# Patient Record
Sex: MALE | Race: WHITE | Employment: FULL TIME | URBAN - METROPOLITAN AREA
[De-identification: names, ages, dates, MRNs, and addresses within clinical notes are randomized per-mention and may not be internally consistent; named-entity substitution may affect disease eponyms.]

---

## 2017-03-20 ENCOUNTER — TELEPHONE (OUTPATIENT)
Dept: FAMILY MEDICINE CLINIC | Age: 42
End: 2017-03-20

## 2017-03-20 DIAGNOSIS — Z20.828 EXPOSURE TO THE FLU: Primary | ICD-10-CM

## 2017-03-20 RX ORDER — OSELTAMIVIR PHOSPHATE 75 MG/1
75 CAPSULE ORAL 2 TIMES DAILY
Qty: 10 CAP | Refills: 0 | Status: SHIPPED | OUTPATIENT
Start: 2017-03-20 | End: 2017-03-25

## 2017-05-06 DIAGNOSIS — F41.9 ANXIETY: ICD-10-CM

## 2017-05-07 RX ORDER — BUSPIRONE HYDROCHLORIDE 15 MG/1
TABLET ORAL
Qty: 180 TAB | Refills: 0 | Status: SHIPPED | COMMUNITY
Start: 2017-05-07 | End: 2017-07-27 | Stop reason: SDUPTHER

## 2017-05-10 ENCOUNTER — OFFICE VISIT (OUTPATIENT)
Dept: FAMILY MEDICINE CLINIC | Age: 42
End: 2017-05-10

## 2017-05-10 VITALS
BODY MASS INDEX: 28.06 KG/M2 | DIASTOLIC BLOOD PRESSURE: 62 MMHG | HEIGHT: 70 IN | OXYGEN SATURATION: 96 % | WEIGHT: 196 LBS | SYSTOLIC BLOOD PRESSURE: 122 MMHG | HEART RATE: 82 BPM | RESPIRATION RATE: 16 BRPM | TEMPERATURE: 98.6 F

## 2017-05-10 DIAGNOSIS — S56.901A INJURY OF FOREARM MUSCLE OR TENDON, RIGHT, INITIAL ENCOUNTER: ICD-10-CM

## 2017-05-10 DIAGNOSIS — E55.9 VITAMIN D DEFICIENCY: ICD-10-CM

## 2017-05-10 DIAGNOSIS — F41.9 ANXIETY: Primary | ICD-10-CM

## 2017-05-10 RX ORDER — METOPROLOL SUCCINATE 25 MG/1
TABLET, EXTENDED RELEASE ORAL
Qty: 90 TAB | Refills: 1 | Status: SHIPPED | OUTPATIENT
Start: 2017-05-10 | End: 2017-10-05 | Stop reason: DRUGHIGH

## 2017-05-10 NOTE — PROGRESS NOTES
Patient is currently not taking the following medications and wants them removed from their list:    no    Learning Assessment (baseline): complete  Depression Screening: complete      1. Have you been to the ER, urgent care clinic since your last visit? Hospitalized since your last visit? No    2. Have you seen or consulted any other health care providers outside of the [address] [place] [address] since your last visit? Include any pap smears or colon screening.  No      Patient is due for the following immunizations:    Influenza: completed

## 2017-05-10 NOTE — PATIENT INSTRUCTIONS
Anxiety Disorder: Care Instructions  Your Care Instructions  Anxiety is a normal reaction to stress. Difficult situations can cause you to have symptoms such as sweaty palms and a nervous feeling. In an anxiety disorder, the symptoms are far more severe. Constant worry, muscle tension, trouble sleeping, nausea and diarrhea, and other symptoms can make normal daily activities difficult or impossible. These symptoms may occur for no reason, and they can affect your work, school, or social life. Medicines, counseling, and self-care can all help. Follow-up care is a key part of your treatment and safety. Be sure to make and go to all appointments, and call your doctor if you are having problems. It's also a good idea to know your test results and keep a list of the medicines you take. How can you care for yourself at home? · Take medicines exactly as directed. Call your doctor if you think you are having a problem with your medicine. · Go to your counseling sessions and follow-up appointments. · Recognize and accept your anxiety. Then, when you are in a situation that makes you anxious, say to yourself, \"This is not an emergency. I feel uncomfortable, but I am not in danger. I can keep going even if I feel anxious. \"  · Be kind to your body:  ¨ Relieve tension with exercise or a massage. ¨ Get enough rest.  ¨ Avoid alcohol, caffeine, nicotine, and illegal drugs. They can increase your anxiety level and cause sleep problems. ¨ Learn and do relaxation techniques. See below for more about these techniques. · Engage your mind. Get out and do something you enjoy. Go to a funny movie, or take a walk or hike. Plan your day. Having too much or too little to do can make you anxious. · Keep a record of your symptoms. Discuss your fears with a good friend or family member, or join a support group for people with similar problems. Talking to others sometimes relieves stress.   · Get involved in social groups, or volunteer to help others. Being alone sometimes makes things seem worse than they are. · Get at least 30 minutes of exercise on most days of the week to relieve stress. Walking is a good choice. You also may want to do other activities, such as running, swimming, cycling, or playing tennis or team sports. Relaxation techniques  Do relaxation exercises 10 to 20 minutes a day. You can play soothing, relaxing music while you do them, if you wish. · Tell others in your house that you are going to do your relaxation exercises. Ask them not to disturb you. · Find a comfortable place, away from all distractions and noise. · Lie down on your back, or sit with your back straight. · Focus on your breathing. Make it slow and steady. · Breathe in through your nose. Breathe out through either your nose or mouth. · Breathe deeply, filling up the area between your navel and your rib cage. Breathe so that your belly goes up and down. · Do not hold your breath. · Breathe like this for 5 to 10 minutes. Notice the feeling of calmness throughout your whole body. As you continue to breathe slowly and deeply, relax by doing the following for another 5 to 10 minutes:  · Tighten and relax each muscle group in your body. You can begin at your toes and work your way up to your head. · Imagine your muscle groups relaxing and becoming heavy. · Empty your mind of all thoughts. · Let yourself relax more and more deeply. · Become aware of the state of calmness that surrounds you. · When your relaxation time is over, you can bring yourself back to alertness by moving your fingers and toes and then your hands and feet and then stretching and moving your entire body. Sometimes people fall asleep during relaxation, but they usually wake up shortly afterward. · Always give yourself time to return to full alertness before you drive a car or do anything that might cause an accident if you are not fully alert.  Never play a relaxation tape while you drive a car. When should you call for help? Call 911 anytime you think you may need emergency care. For example, call if:  · You feel you cannot stop from hurting yourself or someone else. Keep the numbers for these national suicide hotlines: 5-826-984-TALK (0-154.198.3768) and 1-269-QHPQMSC (2-729.493.4439). If you or someone you know talks about suicide or feeling hopeless, get help right away. Watch closely for changes in your health, and be sure to contact your doctor if:  · You have anxiety or fear that affects your life. · You have symptoms of anxiety that are new or different from those you had before. Where can you learn more? Go to http://jorge lHiChinaxin.info/. Enter P754 in the search box to learn more about \"Anxiety Disorder: Care Instructions. \"  Current as of: July 26, 2016  Content Version: 11.2  © 5629-7261 [employer]. Care instructions adapted under license by [employer] (which disclaims liability or warranty for this information). If you have questions about a medical condition or this instruction, always ask your healthcare professional. [address] any warranty or liability for your use of this information. Learning About Vitamin D  Why is it important to get enough vitamin D? Your body needs vitamin D to absorb calcium. Calcium keeps your bones and muscles, including your heart, healthy and strong. If your muscles don't get enough calcium, they can cramp, hurt, or feel weak. You may have long-term (chronic) muscle aches and pains. If you don't get enough vitamin D throughout life, you have an increased chance of having thin and brittle bones (osteoporosis) in your later years. Children who don't get enough vitamin D may not grow as much as others their age. They also have a chance of getting a rare disease called rickets. It causes weak bones. Vitamin D and calcium are added to many foods.  And your body uses sunshine to make its own vitamin D. How much vitamin D do you need? The [place] of Medicine recommends that people ages 3 through 79 get 600 IU (international units) every day. Adults 71 and older need 800 IU every day. Blood tests for vitamin D can check your vitamin D level. But there is no standard normal range used by all laboratories. The [place] of Medicine recommends a blood level of 20 ng/mL of vitamin D for healthy bones. And most people in the [place] and [facility]) meet this goal.  How can you get more vitamin D? Foods that contain vitamin D include:  · [place], tuna, and mackerel. These are some of the best foods to eat when you need to get more vitamin D.  · Cheese, egg yolks, and beef liver. These foods have vitamin D in small amounts. · Milk, soy drinks, orange juice, yogurt, margarine, and some kinds of cereal have vitamin D added to them. Some people don't make vitamin D as well as others. They may have to take extra care in getting enough vitamin D. Things that reduce how much vitamin D your body makes include:  · Dark skin, such as many  Americans have. · Age, especially if you are older than 72. · Digestive problems, such as Crohn's or celiac disease. · Liver and kidney disease. Some people who do not get enough vitamin D may need supplements. Are there any risks from taking vitamin D?  · Too much vitamin D:  ¨ Can damage your kidneys. ¨ Can cause nausea and vomiting, constipation, and weakness. ¨ Raises the amount of calcium in your blood. If this happens, you can get confused or have an irregular heart rhythm. · Vitamin D may interact with other medicines. Tell your doctor about all of the medicines you take, including over-the-counter drugs, herbs, and pills. Tell your doctor about all of your current medical problems. Where can you learn more? Go to http://jorge l-xin.info/.   Enter [id number] in the search box to learn more about \"Learning About Vitamin D.\"  Current as of: July 26, 2016  Content Version: 11.2  © 3972-6134 [employer]. Care instructions adapted under license by [employer] (which disclaims liability or warranty for this information). If you have questions about a medical condition or this instruction, always ask your healthcare professional. [place] [address] any warranty or liability for your use of this information.

## 2017-05-10 NOTE — PROGRESS NOTES
HISTORY OF PRESENT ILLNESS    Leotha Holstein is a 42y.o. year old male with: Anxiety/Depression/ Vit D deficiency        HPI:  Has been doing great with current Tx and did miss one buspar a little while ago and definitely noticed a difference. Pain in elbow right side for a couple months. Used hand as a hammer and pain with rotating arm. Rated 3/10. PHQ-9 SCORE: 0     SHRUTHI-7 SCORE: 3        Current Outpatient Prescriptions   Medication Sig Dispense Refill    busPIRone (BUSPAR) 15 mg tablet TAKE ONE TABLET BY MOUTH TWICE DAILY 180 Tab 0    metoprolol succinate (TOPROL-XL) 25 mg XL tablet TAKE ONE TABLET BY MOUTH NIGHTLY 90 Tab 1    valACYclovir (VALTREX) 1 gram tablet Take 1 Tab by mouth three (3) times daily. 30 Tab 5    HYDROcodone-acetaminophen (NORCO) 5-325 mg per tablet Take 1 Tab by mouth every four (4) hours as needed for Pain for up to 20 doses. Max Daily Amount: 6 Tabs. 20 Tab 0    aspirin delayed-release 81 mg tablet Take  by mouth daily.  cholecalciferol (VITAMIN D3) 1,000 unit tablet Take 1 Tab by mouth daily. 30 Tab 3     Patient Active Problem List   Diagnosis Code    VSD (ventricular septal defect) Q21.0    Atrial flutter (HCC) I48.92    Mitral valve disorders I05.9    Cardiomyopathy ([address] I42.9    S/P VSD repair Z98.890, Z87.74    S/P mitral valve repair Z98.890    S/P ablation of atrial flutter Z98.890, Z86.79    Long term current use of anticoagulant therapy Z79.01    Anxiety F41.9    Vitamin D deficiency E55.9    Encounter for sterilization Z30.2    Epididymitis N45.1     Family History   Problem Relation Age of Onset    Diabetes Maternal Grandmother        ROS:  No swell, bruise, SI, HI. Objective:  Visit Vitals    /62 (BP 1 Location: Right arm, BP Patient Position: Sitting)    Pulse 82    Temp 98.6 °F (37 °C) (Oral)    Resp 16    Ht 5' 10\" (1.778 m)    Wt 196 lb (88.9 kg)    SpO2 96%    BMI 28.12 kg/m2       GEN:  Appears stated age in NAD.   HEAD: Normocephalic, Atraumatic. NEURO:  Sensation intact light touch upper and lower extremities. Biceps & Triceps reflexes +2/4 bilaterally. right hand dominant. M/S:  right elbow/wrist: Positive TTP supinator, worse with resisted supination. Negative shayy tenderness. Phalen's negative. Tinel's negative. Strength +5/5 bilateral .  Piano key sign Negative bilateral .  Carpal bone motion normal.  Finklestein's negative  TFCC Load Test negative. Lateral epicondyle negative TTP has improved with wrist extension. Medial epicondyle negative TTP is unchanged with wrist flexion. negative muscular atrophy. EXT:  no clubbing/cyanosis. no edema. Assessment/Plan:     ICD-10-CM ICD-9-CM    1. Anxiety F41.9 300.00 metoprolol succinate (TOPROL-XL) 25 mg XL tablet   2. Vitamin D deficiency E55.9 268.9 metoprolol succinate (TOPROL-XL) 25 mg XL tablet   3. Injury of forearm muscle or tendon, right, initial encounter S56.901A 959.3        Patient verbalized understanding of plan. Will Rx buspar and convert to express scripts when ready and stretch supinator for elbow pain.

## 2017-05-10 NOTE — MR AVS SNAPSHOT
Visit Information Date & Time Provider Department Dept. Phone Encounter #  
 5/10/2017  4:20 PM Edward Hoffmann [phone] Follow-up Instructions Return if symptoms worsen or fail to improve. Your Appointments 7/6/2017  4:00 PM  
Follow Up with Jac Bright MD  
[facility] ([facility]) Appt Note: 1 year follow up with EKG  
 [address] [place] [phone] [id number] [address] [address] [phone] [address] Upcoming Health Maintenance Date Due DTaP/Tdap/Td series (1 - Tdap) 10/30/1995 Pneumococcal 19-64 Medium Risk (1 of 1 - PPSV23) 11/10/2017* INFLUENZA AGE 9 TO ADULT 8/1/2017 *Topic was postponed. The date shown is not the original due date. Allergies as of 5/10/2017  Review Complete On: 5/10/2017 By: Saida Patrick DO No Known Allergies Current Immunizations  Never Reviewed Name Date Influenza Vaccine 10/14/2016 Influenza Vaccine (Quad) PF 9/2/2015 Not reviewed this visit You Were Diagnosed With   
  
 Codes Comments Anxiety    -  Primary ICD-10-CM: F41.9 ICD-9-CM: 300.00 Vitamin D deficiency     ICD-10-CM: E55.9 ICD-9-CM: 268.9 Injury of forearm muscle or tendon, right, initial encounter     ICD-10-CM: J84.536X ICD-9-CM: 763. 3 Vitals BP Pulse Temp Resp Height(growth percentile) Weight(growth percentile) 122/62 (BP 1 Location: Right arm, BP Patient Position: Sitting) 82 98.6 °F (37 °C) (Oral) 16 5' 10\" (1.778 m) 196 lb (88.9 kg) SpO2 BMI Smoking Status 96% 28.12 kg/m2 Never Smoker BMI and BSA Data Body Mass Index Body Surface Area  
 28.12 kg/m 2 2.1 m 2 Preferred Pharmacy Pharmacy Name Phone [facility] [address] Your Updated Medication List  
  
 This list is accurate as of: 5/10/17  4:40 PM.  Always use your most recent med list.  
  
  
  
  
 aspirin delayed-release 81 mg tablet Take  by mouth daily. busPIRone 15 mg tablet Commonly known as:  BUSPAR  
TAKE ONE TABLET BY MOUTH TWICE DAILY  
  
 cholecalciferol 1,000 unit tablet Commonly known as:  VITAMIN D3 Take 1 Tab by mouth daily. metoprolol succinate 25 mg XL tablet Commonly known as:  TOPROL-XL  
TAKE ONE TABLET BY MOUTH NIGHTLY  
  
 valACYclovir 1 gram tablet Commonly known as:  VALTREX Take 1 Tab by mouth three (3) times daily. Prescriptions Sent to Pharmacy Refills  
 metoprolol succinate (TOPROL-XL) 25 mg XL tablet 1 Sig: TAKE ONE TABLET BY MOUTH NIGHTLY Class: Normal  
 Pharmacy: [id number] [address] [place] [address] [facility] #: [phone] Follow-up Instructions Return if symptoms worsen or fail to improve. Patient Instructions Anxiety Disorder: Care Instructions Your Care Instructions Anxiety is a normal reaction to stress. Difficult situations can cause you to have symptoms such as sweaty palms and a nervous feeling. In an anxiety disorder, the symptoms are far more severe. Constant worry, muscle tension, trouble sleeping, nausea and diarrhea, and other symptoms can make normal daily activities difficult or impossible. These symptoms may occur for no reason, and they can affect your work, school, or social life. Medicines, counseling, and self-care can all help. Follow-up care is a key part of your treatment and safety. Be sure to make and go to all appointments, and call your doctor if you are having problems. It's also a good idea to know your test results and keep a list of the medicines you take. How can you care for yourself at home? · Take medicines exactly as directed. Call your doctor if you think you are having a problem with your medicine. · Go to your counseling sessions and follow-up appointments. · Recognize and accept your anxiety. Then, when you are in a situation that makes you anxious, say to yourself, \"This is not an emergency. I feel uncomfortable, but I am not in danger. I can keep going even if I feel anxious. \" · Be kind to your body: ¨ Relieve tension with exercise or a massage. ¨ Get enough rest. 
¨ Avoid alcohol, caffeine, nicotine, and illegal drugs. They can increase your anxiety level and cause sleep problems. ¨ Learn and do relaxation techniques. See below for more about these techniques. · Engage your mind. Get out and do something you enjoy. Go to a funny movie, or take a walk or hike. Plan your day. Having too much or too little to do can make you anxious. · Keep a record of your symptoms. Discuss your fears with a good friend or family member, or join a support group for people with similar problems. Talking to others sometimes relieves stress. · Get involved in social groups, or volunteer to help others. Being alone sometimes makes things seem worse than they are. · Get at least 30 minutes of exercise on most days of the week to relieve stress. Walking is a good choice. You also may want to do other activities, such as running, swimming, cycling, or playing tennis or team sports. Relaxation techniques Do relaxation exercises 10 to 20 minutes a day. You can play soothing, relaxing music while you do them, if you wish. · Tell others in your house that you are going to do your relaxation exercises. Ask them not to disturb you. · Find a comfortable place, away from all distractions and noise. · Lie down on your back, or sit with your back straight. · Focus on your breathing. Make it slow and steady. · Breathe in through your nose. Breathe out through either your nose or mouth. · Breathe deeply, filling up the area between your navel and your rib cage. Breathe so that your belly goes up and down. · Do not hold your breath. · Breathe like this for 5 to 10 minutes. Notice the feeling of calmness throughout your whole body. As you continue to breathe slowly and deeply, relax by doing the following for another 5 to 10 minutes: · Tighten and relax each muscle group in your body. You can begin at your toes and work your way up to your head. · Imagine your muscle groups relaxing and becoming heavy. · Empty your mind of all thoughts. · Let yourself relax more and more deeply. · Become aware of the state of calmness that surrounds you. · When your relaxation time is over, you can bring yourself back to alertness by moving your fingers and toes and then your hands and feet and then stretching and moving your entire body. Sometimes people fall asleep during relaxation, but they usually wake up shortly afterward. · Always give yourself time to return to full alertness before you drive a car or do anything that might cause an accident if you are not fully alert. Never play a relaxation tape while you drive a car. When should you call for help? Call 911 anytime you think you may need emergency care. For example, call if: 
· You feel you cannot stop from hurting yourself or someone else. Keep the numbers for these national suicide hotlines: 7-755-010-TALK (3-526.246.5315) and 9-523-PCVJSPW (3-195.620.4101). If you or someone you know talks about suicide or feeling hopeless, get help right away. Watch closely for changes in your health, and be sure to contact your doctor if: 
· You have anxiety or fear that affects your life. · You have symptoms of anxiety that are new or different from those you had before. Where can you learn more? Go to http://jorge l-xin.info/. Enter P754 in the search box to learn more about \"Anxiety Disorder: Care Instructions. \" Current as of: July 26, 2016 Content Version: 11.2 © 6590-5418 [employer].  Care instructions adapted under license by [facility] [address] (which disclaims liability or warranty for this information). If you have questions about a medical condition or this instruction, always ask your healthcare professional. [address] any warranty or liability for your use of this information. Learning About Vitamin D Why is it important to get enough vitamin D? Your body needs vitamin D to absorb calcium. Calcium keeps your bones and muscles, including your heart, healthy and strong. If your muscles don't get enough calcium, they can cramp, hurt, or feel weak. You may have long-term (chronic) muscle aches and pains. If you don't get enough vitamin D throughout life, you have an increased chance of having thin and brittle bones (osteoporosis) in your later years. Children who don't get enough vitamin D may not grow as much as others their age. They also have a chance of getting a rare disease called rickets. It causes weak bones. Vitamin D and calcium are added to many foods. And your body uses sunshine to make its own vitamin D. How much vitamin D do you need? The [place] of Medicine recommends that people ages 3 through 79 get 600 IU (international units) every day. Adults 71 and older need 800 IU every day. Blood tests for vitamin D can check your vitamin D level. But there is no standard normal range used by all laboratories. The [place] of Medicine recommends a blood level of 20 ng/mL of vitamin D for healthy bones. And most people in the [place] and [facility] meet this goal. 
How can you get more vitamin D? Foods that contain vitamin D include: 
· [place], tuna, and mackerel. These are some of the best foods to eat when you need to get more vitamin D. 
· Cheese, egg yolks, and beef liver. These foods have vitamin D in small amounts. · Milk, soy drinks, orange juice, yogurt, margarine, and some kinds of cereal have vitamin D added to them. Some people don't make vitamin D as well as others. They may have to take extra care in getting enough vitamin D. Things that reduce how much vitamin D your body makes include: · Dark skin, such as many  Americans have. · Age, especially if you are older than 72. · Digestive problems, such as Crohn's or celiac disease. · Liver and kidney disease. Some people who do not get enough vitamin D may need supplements. Are there any risks from taking vitamin D? 
· Too much vitamin D: 
¨ Can damage your kidneys. ¨ Can cause nausea and vomiting, constipation, and weakness. ¨ Raises the amount of calcium in your blood. If this happens, you can get confused or have an irregular heart rhythm. · Vitamin D may interact with other medicines. Tell your doctor about all of the medicines you take, including over-the-counter drugs, herbs, and pills. Tell your doctor about all of your current medical problems. Where can you learn more? Go to http://jorge l-xin.info/. Enter [id number] in the search box to learn more about \"Learning About Vitamin D.\" 
Current as of: July 26, 2016 Content Version: 11.2 © 2508-3683 [employer]. Care instructions adapted under license by Currently (which disclaims liability or warranty for this information). If you have questions about a medical condition or this instruction, always ask your healthcare professional. [address] any warranty or liability for your use of this information. Introducing [facility]! Dear Boone Ricks: Thank you for requesting a SparkupReader account. Our records indicate that you have previously registered for a SparkupReader account but its currently inactive. Please call our SparkupReader support line at [phone]. Additional Information If you have questions, please visit the Frequently Asked Questions section of the SparkupReader website at [email or web address] Remember, Fanplayrhart is NOT to be used for urgent needs. For medical emergencies, dial 911. Now available from your iPhone and Android! Please provide this summary of care documentation to your next provider. Your primary care clinician is listed as Ryder Sheridan. If you have any questions after today's visit, please call [phone].

## 2017-07-06 ENCOUNTER — OFFICE VISIT (OUTPATIENT)
Dept: CARDIOLOGY CLINIC | Age: 42
End: 2017-07-06

## 2017-07-06 VITALS
HEIGHT: 70 IN | OXYGEN SATURATION: 99 % | RESPIRATION RATE: 18 BRPM | HEART RATE: 89 BPM | SYSTOLIC BLOOD PRESSURE: 104 MMHG | BODY MASS INDEX: 27.6 KG/M2 | DIASTOLIC BLOOD PRESSURE: 80 MMHG | WEIGHT: 192.8 LBS

## 2017-07-06 DIAGNOSIS — I48.92 ATRIAL FLUTTER, UNSPECIFIED TYPE (HCC): Primary | ICD-10-CM

## 2017-07-06 DIAGNOSIS — Z98.890 S/P MITRAL VALVE REPAIR: ICD-10-CM

## 2017-07-06 DIAGNOSIS — Z87.74 S/P VSD REPAIR: ICD-10-CM

## 2017-07-06 NOTE — MR AVS SNAPSHOT
Visit Information Date & Time Provider Department Dept. Phone Encounter #  
 7/6/2017  4:00 PM Emilee Ewing MD [facility] [phone] Upcoming Health Maintenance Date Due DTaP/Tdap/Td series (1 - Tdap) 10/30/1995 Pneumococcal 19-64 Medium Risk (1 of 1 - PPSV23) 11/10/2017* INFLUENZA AGE 9 TO ADULT 8/1/2017 *Topic was postponed. The date shown is not the original due date. Allergies as of 7/6/2017  Review Complete On: 7/6/2017 By: Lenora Redman LPN No Known Allergies Current Immunizations  Never Reviewed Name Date Influenza Vaccine 10/14/2016 Influenza Vaccine (Quad) PF 9/2/2015 Not reviewed this visit You Were Diagnosed With   
  
 Codes Comments Atrial flutter, unspecified type ([facility]    -  Primary ICD-10-CM: I48.92 
ICD-9-CM: 427.32 Vitals BP Pulse Resp Height(growth percentile) Weight(growth percentile) SpO2  
 104/80 89 18 5' 10\" (1.778 m) 192 lb 12.8 oz (87.5 kg) 99% BMI Smoking Status 27.66 kg/m2 Never Smoker BMI and BSA Data Body Mass Index Body Surface Area  
 27.66 kg/m 2 2.08 m 2 Preferred Pharmacy Pharmacy Name Phone [facility] [address] [facility]  Your Updated Medication List  
  
   
This list is accurate as of: 7/6/17  4:43 PM.  Always use your most recent med list.  
  
  
  
  
 aspirin delayed-release 81 mg tablet Take  by mouth daily. busPIRone 15 mg tablet Commonly known as:  BUSPAR  
TAKE ONE TABLET BY MOUTH TWICE DAILY  
  
 cholecalciferol 1,000 unit tablet Commonly known as:  VITAMIN D3 Take 1 Tab by mouth daily. metoprolol succinate 25 mg XL tablet Commonly known as:  TOPROL-XL  
TAKE ONE TABLET BY MOUTH NIGHTLY  
  
 valACYclovir 1 gram tablet Commonly known as:  VALTREX Take 1 Tab by mouth three (3) times daily. We Performed the Following AMB POC EKG ROUTINE W/ 12 LEADS, INTER & REP [92477 CPT(R)] Introducing [facility] SERVICES! Dear Reza Smith: Thank you for requesting a Quolaw account. Our records indicate that you have previously registered for a Quolaw account but its currently inactive. Please call our Quolaw support line at [phone]. Additional Information If you have questions, please visit the Frequently Asked Questions section of the Quolaw website at [email or web address] Remember, Quolaw is NOT to be used for urgent needs. For medical emergencies, dial 911. Now available from your iPhone and Android! Please provide this summary of care documentation to your next provider. Your primary care clinician is listed as Dante Font. If you have any questions after today's visit, please call [phone].

## 2017-07-06 NOTE — PROGRESS NOTES
History of Present Illness:  A 42 y.o. male here for follow up. He is working in a marine occupation with [address] He has a history of mitral stenosis after mitral valve ring. He had atrial flutter that was ablated June 2013 by Dr. Suzie Minaya due to atypical flutter. He denies chest pain,dyspnea, presyncope, syncope, PND, orthopnea, edema. He is off Coumadin and back on aspirin and has not had any clinical recurrence in almost four years.       Impression/Plan:    Paroxysmal atrial flutter with difficult to control rates and right sided figure eight ablation 6/2013 by Dr. Suzie Minaya. No clinical recurrence. History of mild to moderate mitral stenosis in sinus rhythm, but severe when in atrial flutter. History of remote mitral valve ring. History of VSD repair. History of congestive heart failure with atrial flutter, but now compensated. Nuclear stress test in 03/2014 without ischemia.     From a cardiac standpoint, he is doing well. He is maintaining sinus rhythm. He has not had any clinical recurrence of atrial flutter or atrial fibrillation. He wanted to be off Coumadin last year which is reasonable given no clinical recurrence but once again I discussed the potential of asymptomatic arrhythmia and the risk of stroke. He is taking aspirin only. I will recheck an echocardiogram to make sure there is no significant change in his heart function. Last ejection fraction was mildly depressed during his nuclear scan with EF of 49% March 2014. All questions answered. Past Medical History:   Diagnosis Date    Anxiety     Atrial flutter with rapid ventricular response [facility]) August 2013    Status post cardioversion    Cardiac Holter exam 07/20/2015    Normal 48-hr Holter. Sinus rhythm, avg HR 78 bpm.  No ventricular ectopy. Very rare supraventricular ectopy.  Cardiac nuclear imaging test 03/18/2014    No ischemia or prior infarction. No RWMA. EF 49%.   Nondiagnostic EKG on max EST due to abnormal baseline. Ex time 13 min 5 sec.  Cardiac transesophageal echocardiography (ROCAEL) 11/25/2013    EF 35%. Mod diffuse hypk. Marked LAE. Dilated LA appendage w/no thrombus. No L-R or R-L atrial septal shunt by contrast.  Marked TYSON. MV annular ring prosthesis. Mod MS. Mild MR.  Cardiomyopathy ([address]     EF 40-45%    Herniated disc     Mitral valve stenosis, moderate     S/P mitral valve repair     severe MR    S/P VSD repair     Vitamin D deficiency 12/10/2014       Current Outpatient Prescriptions   Medication Sig Dispense Refill    metoprolol succinate (TOPROL-XL) 25 mg XL tablet TAKE ONE TABLET BY MOUTH NIGHTLY 90 Tab 1    busPIRone (BUSPAR) 15 mg tablet TAKE ONE TABLET BY MOUTH TWICE DAILY 180 Tab 0    valACYclovir (VALTREX) 1 gram tablet Take 1 Tab by mouth three (3) times daily. 30 Tab 5    aspirin delayed-release 81 mg tablet Take  by mouth daily.  cholecalciferol (VITAMIN D3) 1,000 unit tablet Take 1 Tab by mouth daily. (Patient taking differently: Take 1,000 Units by mouth daily. 2 tabs daily) 30 Tab 3       Social History   reports that he has never smoked. He has never used smokeless tobacco.   reports that he drinks alcohol. Family History  family history includes Diabetes in his maternal grandmother. Review of Systems  Except as stated above include:  Constitutional: Negative for fever, chills and malaise/fatigue. HEENT: No congestion or recent URI. Gastrointestinal: No nausea, vomiting, abdominal pain, bloody stools. Pulmonary:  Negative except as stated above. Cardiac:  Negative except as stated above. Musculoskeletal: Negative except as stated above. Neurological:  No localized symptoms. Skin:  Negative except as stated above. Psych:  No mood swings. Endocrine:  No heat/cold intolerance.     PHYSICAL EXAM  BP Readings from Last 3 Encounters:   07/06/17 104/80   05/10/17 122/62   11/28/16 118/70     Pulse Readings from Last 3 Encounters:   07/06/17 89   05/10/17 82   11/28/16 78     Wt Readings from Last 3 Encounters:   07/06/17 87.5 kg (192 lb 12.8 oz)   05/10/17 88.9 kg (196 lb)   11/28/16 90.7 kg (200 lb)     General:   Well developed, well groomed. Head/Neck:   No jugular venous distention     No carotid bruits. No evidence of xanthelasma. Lungs:   No respiratory distress. Clear bilaterally. Heart:    Regular rate and rhythm. Normal S1/S2. Palpation of heart with normal point of maximum impulse. No significant murmurs, rubs or gallops. Abdomen:   Soft and nontender. No palpable abdominal mass or bruits. Extremities:   Intact peripheral pulses. No significant edema. Neurological:   Alert and oriented to person, place, time. No focal neurological deficit visually.

## 2017-07-20 ENCOUNTER — HOSPITAL ENCOUNTER (OUTPATIENT)
Dept: NON INVASIVE DIAGNOSTICS | Age: 42
Discharge: HOME | End: 2017-07-20
Attending: INTERNAL MEDICINE
Payer: COMMERCIAL

## 2017-07-20 DIAGNOSIS — I48.92 ATRIAL FLUTTER, UNSPECIFIED TYPE (HCC): ICD-10-CM

## 2017-07-20 DIAGNOSIS — Z87.74 S/P VSD REPAIR: ICD-10-CM

## 2017-07-20 DIAGNOSIS — Z98.890 S/P MITRAL VALVE REPAIR: ICD-10-CM

## 2017-07-20 PROCEDURE — 93306 TTE W/DOPPLER COMPLETE: CPT

## 2017-07-26 DIAGNOSIS — F41.9 ANXIETY: ICD-10-CM

## 2017-07-27 RX ORDER — BUSPIRONE HYDROCHLORIDE 15 MG/1
TABLET ORAL
Qty: 180 TAB | Refills: 0 | Status: SHIPPED | COMMUNITY
Start: 2017-07-27 | End: 2017-10-30 | Stop reason: SDUPTHER

## 2017-10-03 ENCOUNTER — CLINICAL SUPPORT (OUTPATIENT)
Dept: CARDIOLOGY CLINIC | Age: 42
End: 2017-10-03

## 2017-10-03 VITALS
HEART RATE: 121 BPM | SYSTOLIC BLOOD PRESSURE: 116 MMHG | DIASTOLIC BLOOD PRESSURE: 80 MMHG | OXYGEN SATURATION: 97 % | HEIGHT: 70 IN

## 2017-10-03 DIAGNOSIS — I48.92 ATRIAL FLUTTER, UNSPECIFIED TYPE (HCC): Primary | ICD-10-CM

## 2017-10-03 NOTE — PROGRESS NOTES
Visit Vitals    /80 (BP 1 Location: Left arm, BP Patient Position: Sitting)    Pulse (!) 121    Ht 5' 10\" (1.778 m)    SpO2 97%     Current Outpatient Prescriptions   Medication Sig    busPIRone (BUSPAR) 15 mg tablet TAKE ONE TABLET BY MOUTH TWICE DAILY    metoprolol succinate (TOPROL-XL) 25 mg XL tablet TAKE ONE TABLET BY MOUTH NIGHTLY    valACYclovir (VALTREX) 1 gram tablet Take 1 Tab by mouth three (3) times daily.  aspirin delayed-release 81 mg tablet Take  by mouth daily.  cholecalciferol (VITAMIN D3) 1,000 unit tablet Take 1 Tab by mouth daily. (Patient taking differently: Take 1,000 Units by mouth daily. 2 tabs daily)     No current facility-administered medications for this visit. Patient walked in complaining of heart racing. EKG done and discussed with Dr. Fernando Roth. Increase Toprol XL to 50 mg twice daily. Follow up with Dr. Jem Covington. Verbal order and read back per Jesus Billings M.D.    EKG:   Ectopic atrial tachycardia, rate 121 with complete RBBB    Pt aware.  Appt scheduled with Orlena Lennox, NP this Thursday and Dr. Jem Covington on 10/19/17

## 2017-10-05 ENCOUNTER — OFFICE VISIT (OUTPATIENT)
Dept: CARDIOLOGY CLINIC | Age: 42
End: 2017-10-05

## 2017-10-05 VITALS
HEIGHT: 70 IN | DIASTOLIC BLOOD PRESSURE: 74 MMHG | OXYGEN SATURATION: 98 % | SYSTOLIC BLOOD PRESSURE: 98 MMHG | WEIGHT: 192 LBS | HEART RATE: 123 BPM | BODY MASS INDEX: 27.49 KG/M2

## 2017-10-05 DIAGNOSIS — I48.92 ATRIAL FLUTTER, UNSPECIFIED TYPE (HCC): Primary | ICD-10-CM

## 2017-10-05 DIAGNOSIS — Z98.890 S/P ABLATION OF ATRIAL FLUTTER: ICD-10-CM

## 2017-10-05 DIAGNOSIS — Z87.74 S/P VSD REPAIR: ICD-10-CM

## 2017-10-05 DIAGNOSIS — Z86.79 S/P ABLATION OF ATRIAL FLUTTER: ICD-10-CM

## 2017-10-05 DIAGNOSIS — Z79.01 LONG TERM CURRENT USE OF ANTICOAGULANT THERAPY: ICD-10-CM

## 2017-10-05 LAB
INR BLD: 0.9
PT POC: 11.3 SECONDS
VALID INTERNAL CONTROL?: YES

## 2017-10-05 RX ORDER — WARFARIN SODIUM 5 MG/1
5 TABLET ORAL DAILY
Qty: 45 TAB | Refills: 4 | Status: SHIPPED | OUTPATIENT
Start: 2017-10-05 | End: 2018-07-20 | Stop reason: SDUPTHER

## 2017-10-05 RX ORDER — METOPROLOL SUCCINATE 50 MG/1
50 TABLET, EXTENDED RELEASE ORAL 2 TIMES DAILY
Qty: 60 TAB | Refills: 4 | Status: SHIPPED | OUTPATIENT
Start: 2017-10-05 | End: 2018-03-14 | Stop reason: SDUPTHER

## 2017-10-05 NOTE — MR AVS SNAPSHOT
Visit Information Date & Time Provider Department Dept. Phone Encounter #  
 10/5/2017  2:00 PM Bandar Beltran NP [facility] [id number] Your Appointments 10/10/2017  1:10 PM  
ANTICOAG NURSE with Pt Inr Hv Csi [facility] ([address] [place] [address]) [address] [id number] [phone] [address] [facility] [address] [address]  
  
    
 10/19/2017 10:20 AM  
Follow Up with Ruth Ruiz MD  
[facility] ([address] [place] [address]) Appt Note: aflutter [address] [id number] [phone] [address] [facility] [address] [address]  
  
    
 7/5/2018  4:00 PM  
Follow Up with Ruth Ruiz MD  
[facility] ([address] [place] [address]) Appt Note: 1 year follow up [address] [address]  
[phone] Upcoming Health Maintenance Date Due DTaP/Tdap/Td series (1 - Tdap) 10/30/1995 INFLUENZA AGE 9 TO ADULT 8/1/2017 Pneumococcal 19-64 Medium Risk (1 of 1 - PPSV23) 11/10/2017* *Topic was postponed. The date shown is not the original due date. Allergies as of 10/5/2017  Review Complete On: 10/5/2017 By: Bandar Beltran NP No Known Allergies Current Immunizations  Never Reviewed Name Date Influenza Vaccine 10/14/2016 Influenza Vaccine (Quad) PF 9/2/2015 Not reviewed this visit You Were Diagnosed With   
  
 Codes Comments Atrial flutter, unspecified type ([facility]    -  Primary ICD-10-CM: I48.92 
ICD-9-CM: 427.32 S/P VSD repair     ICD-10-CM: Z98.890, Z87.74 ICD-9-CM: V45.89 S/P ablation of atrial flutter     ICD-10-CM: Z98.890, Z86.79 
ICD-9-CM: V45.89  Long term current use of anticoagulant therapy     ICD-10-CM: Z79.01 
ICD-9-CM: V58.61   
  
 Vitals BP Pulse Height(growth percentile) Weight(growth percentile) SpO2 BMI  
 98/74 (!) 123 5' 10\" (1.778 m) 192 lb (87.1 kg) 98% 27.55 kg/m2 Smoking Status Never Smoker Vitals History BMI and BSA Data Body Mass Index Body Surface Area  
 27.55 kg/m 2 2.07 m 2 [facility] [address] [place], [place] [address] Your Updated Medication List  
  
   
This list is accurate as of: 10/5/17  3:04 PM.  Always use your most recent med list.  
  
  
  
  
 aspirin delayed-release 81 mg tablet Take  by mouth daily. busPIRone 15 mg tablet Commonly known as:  BUSPAR  
TAKE ONE TABLET BY MOUTH TWICE DAILY  
  
 cholecalciferol 1,000 unit tablet Commonly known as:  VITAMIN D3 Take 1 Tab by mouth daily. metoprolol succinate 50 mg XL tablet Commonly known as:  TOPROL-XL Take 1 Tab by mouth two (2) times a day. valACYclovir 1 gram tablet Commonly known as:  VALTREX Take 1 Tab by mouth three (3) times daily. warfarin 5 mg tablet Commonly known as:  COUMADIN Take 1 Tab by mouth daily. Or as directed Prescriptions Sent to Pharmacy Refills  
 metoprolol succinate (TOPROL-XL) 50 mg XL tablet 4 Sig: Take 1 Tab by mouth two (2) times a day. Class: Normal  
 Pharmacy: [id number] [place] [address] Ph #: [phone] Route: Oral  
 warfarin (COUMADIN) 5 mg tablet 4 Sig: Take 1 Tab by mouth daily. Or as directed Class: Normal  
 Pharmacy: [address] [place] [address] Ph #: [phone] Route: Oral  
  
Description Restart Coumadin at 5mg daily except 2.5mg on Sat October 2017 Details [facility] Tue Wed Thu Fri Sat  
  1  
  
  
  
   2  
  
  
  
   3  
  
  
  
   4  
  
  
  
   5  
  
5 mg See details 6  
  
5 mg 7  
  
2.5 mg  
  
  
  8  
  
5 mg  
  
   9  
  
5 mg  
  
   10  
  
5 mg  
  
   11  
  
  
  
   12  
  
  
  
   13  
  
  
  
   14  
  
  
  
  
  15  
  
  
  
   16  
  
  
  
   17  
  
  
  
   18  
  
  
  
   19  
  
  
  
   20  
  
  
  
   21  
  
  
  
  
  22  
  
  
  
   23  
  
  
  
   24  
  
  
  
   25  
  
  
  
   26  
  
  
  
   27  
  
  
  
   28  
  
  
  
  
  29  
  
  
  
   30  
  
  
  
   31  
  
  
  
      
 Date Details 10/05 This INR check INR: 0.9! Date of next INR:  10/10/2017 How to take your warfarin dose To take:  2.5 mg Take half of a 5 mg tablet. To take:  5 mg Take one of the 5 mg tablets. We Performed the Following AMB POC EKG ROUTINE W/ 12 LEADS, INTER & REP [72768 CPT(R)] AMB POC PT/INR [45425 CPT(R)] Patient Instructions Begin Coumadin 5mg daily except 2.5mg on Sat Protime on Tuesday, 10/10/17 Continue present medication regimen ROCAEL/Cardioversion scheduled for 10/13/17 @ 10:30am  
 
 
  
Introducing [facility] SERVICES! Dear Caty Pino: Thank you for requesting a Birthday Gorilla account. Our records indicate that you already have an active Birthday Gorilla account. You can access your account anytime at [email or web address] Did you know that you can access your hospital and ER discharge instructions at any time in Birthday Gorilla? You can also review all of your test results from your hospital stay or ER visit. Additional Information If you have questions, please visit the Frequently Asked Questions section of the Birthday Gorilla website at [email or web address] Remember, Birthday Gorilla is NOT to be used for urgent needs. For medical emergencies, dial 911. Now available from your iPhone and Android! Please provide this summary of care documentation to your next provider. Your primary care clinician is listed as Alan Ochoa.  If you have any questions after today's visit, please call [phone].

## 2017-10-05 NOTE — PATIENT INSTRUCTIONS
Begin Coumadin 5mg daily except 2.5mg on Sat  Protime on Tuesday, 10/10/17  Continue present medication regimen  ROCAEL/Cardioversion scheduled for 10/13/17 @ 10:30am

## 2017-10-05 NOTE — PROGRESS NOTES
1. Have you been to the ER, urgent care clinic since your last visit? Hospitalized since your last visit? No     2. Have you seen or consulted any other health care providers outside of the [address] [place] [address] since your last visit? Include any pap smears or colon screening.  No

## 2017-10-05 NOTE — PROGRESS NOTES
Monie Damon presents today for follow-up after his Toprol XL was increased to 50mg BID. He came to the office the other with complaints of palpitations and the return of atrial flutter. He noticed immediately when his heart rhythm changed as he had sudden onset of fatigue and weakness in his legs. With the elevated heart rate, he noticed dyspnea on exertion. An EKG was done that day and his heart rate was in the 120's. He was asked to return today for follow-up. He is a 42year old  male with history of paroxysmal atrial flutter with difficult to control rates (s/p right sided figure 8 ablation in June 2013 by Dr. Amber Martel), mild to moderate stenosis when he is in sinus rhythm and severe when in atrial flutter, history of mitral valve repair (ring), history of VSD repair. He was last seen by Dr. Dalia Stephens in early July and up until earlier this week, he had no clinical recurrence of atrial flutter. His last echo was done in July 2017, which showed an EF of 50-55%, possible mild hypokinesis of the basal mid anteroseptal walls, grade 2 diastolic dysfunction, \"bounce motion\" in the ventricular septum. Patch closing of the VSD was intact. His last stress test was done in March 2014 which was negative for ischemia. He denies chest pain, tightness, heaviness, and palpitations. He admits to a \"dull ache\" in his chest when his rate is rapid. Denies shortness of breath at rest, dyspnea on exertion, orthopnea and PND. Denies abdominal bloating. Denies lightheadedness, dizziness, and syncope. Denies lower extremity edema and claudication. Denies nausea, vomiting, diarrhea, melena, hematochezia. Denies hematuria, urgency, frequency. Denies fever, chills. He admits to weakness/fatigue in his legs when he is in atrial flutter.       PMH:  Past Medical History:   Diagnosis Date    Anxiety     Atrial flutter with rapid ventricular response [facility]) August 2013    Status post cardioversion    Cardiac Holter exam 07/20/2015    Normal 48-hr Holter. Sinus rhythm, avg HR 78 bpm.  No ventricular ectopy. Very rare supraventricular ectopy.  Cardiac nuclear imaging test 03/18/2014    No ischemia or prior infarction. No RWMA. EF 49%. Nondiagnostic EKG on max EST due to abnormal baseline. Ex time 13 min 5 sec.  Cardiac transesophageal echocardiography (ROCAEL) 11/25/2013    EF 35%. Mod diffuse hypk. Marked LAE. Dilated LA appendage w/no thrombus. No L-R or R-L atrial septal shunt by contrast.  Marked TYSON. MV annular ring prosthesis. Mod MS. Mild MR.  Cardiomyopathy ([address]     EF 40-45%    Herniated disc     Mitral valve stenosis, moderate     S/P mitral valve repair     severe MR    S/P VSD repair     Vitamin D deficiency 12/10/2014       PSH:  Past Surgical History:   Procedure Laterality Date    HX AFIB ABLATION  2014    Flutter/fib    [place]       MEDS:  Current Outpatient Prescriptions   Medication Sig    metoprolol succinate (TOPROL-XL) 50 mg XL tablet Take 1 Tab by mouth two (2) times a day.  busPIRone (BUSPAR) 15 mg tablet TAKE ONE TABLET BY MOUTH TWICE DAILY    valACYclovir (VALTREX) 1 gram tablet Take 1 Tab by mouth three (3) times daily.  aspirin delayed-release 81 mg tablet Take  by mouth daily.  cholecalciferol (VITAMIN D3) 1,000 unit tablet Take 1 Tab by mouth daily. (Patient taking differently: Take 1,000 Units by mouth daily. 2 tabs daily)     No current facility-administered medications for this visit. Allergies and Sensitivities:  No Known Allergies    Family History:  Family History   Problem Relation Age of Onset    Diabetes Maternal Grandmother        Social History:  He  reports that he has never smoked. He has never used smokeless tobacco.  He  reports that he drinks alcohol.       Physical:  Visit Vitals    BP 98/74    Pulse (!) 123    Ht 5' 10\" (1.778 m)    Wt 87.1 kg (192 lb)    SpO2 98%    BMI 27.55 kg/m2       His weight is unchanged since his last visit. Exam:  Neck:  Supple, no JVD, no carotid bruits  CV:  Normal S1 and  S2, no murmurs, rubs, or gallops noted  Lungs:  Clear to ausculation throughout, no wheezes or rales  Abd:  Soft, non-tender, non-distended with good bowel sounds. No hepatosplenomegaly  Extremities:  No edema      Data:  EKG:   Atrial flutter with 2:1 block and ventricular response of 123.  CRBBB      LABS:  Lab Results   Component Value Date/Time    Sodium 138 11/02/2016 12:00 AM    Potassium 4.2 11/02/2016 12:00 AM    Chloride 99 11/02/2016 12:00 AM    CO2 24 11/02/2016 12:00 AM    Glucose 77 11/02/2016 12:00 AM    BUN 15 11/02/2016 12:00 AM    Creatinine 0.98 11/02/2016 12:00 AM     Lab Results   Component Value Date/Time    Cholesterol, total 164 11/02/2016 12:00 AM    HDL Cholesterol 41 11/02/2016 12:00 AM    LDL, calculated 108 11/02/2016 12:00 AM    Triglyceride 74 11/02/2016 12:00 AM     Lab Results   Component Value Date/Time    ALT (SGPT) 12 11/02/2016 12:00 AM         Impression/Plan:  1. Atrial flutter with difficult to control rates, s/p figure 8 ablation in 2013 by Dr. Ilan Herrera, atrial flutter has reoccurred  2. History of mitral valve repair (ring)  3. History of VSD repair, patch intact (echo done July 2017)    Mr. Jean Paul rCuz was seen today for follow-up after his Toprol XL was increased to 50mg BID after he went back into atrial flutter with difficult to control rates earlier this week. His EKG today shows that he remains in atrial flutter with heart rate in the 120's. He states that he knew he went back into it as he noticed fatigue and weakness in his legs. He does not notice any palpitations and is generally unaware of his heart rate. When seen by Dr. Judit Menendez in July 2017, he was doing well without clinical recurrence of atrial flutter.     His EKG was reviewed by Dr. Judit Menendez who recommends re-initiation of Coumadin therapy, ROCAEL/cardioversion, and re-evaluation by Dr. Tania Dickens. He will be started on Coumadin 5mg daily except 2.5mg on Saturday and he will return for a protime on 10/10/17. His baseline INR today was 0.9 with a protime of 11.3. He has been scheduled for ROCAEL/cardioversion on 10/13/17, to be performed by Dr. Sharon Rosado. Pre-procedure instructions were reviewed with him by our . He denies chest pain but admits to a \"dull ache\" in his chest since he went back into atrial flutter. He denies shortness of breath. He does notice some fatigue when walking since going back into atrial flutter. His breath sounds are clear and he exhibits no signs of volume overload. His blood pressure is in the upper 90's and he denies lightheadedness and dizziness. He states that normally his blood pressure is in the 96'S and 70'I systolic. If he becomes symptomatic with the atrial flutter, I.e. Chest pain, dyspnea, edema; he is to go to the ER for further evaluation and treatment. His wife was present in the room with him today and their questions were answered. He will follow-up with Dr. Sharon Rosado as scheduled and as needed. Toni Wellington MSN, FNP-BC    Please note:  Portions of this chart were created with Dragon medical speech to text program.  Unrecognized errors may be present.

## 2017-10-10 ENCOUNTER — ANTI-COAG VISIT (OUTPATIENT)
Dept: CARDIOLOGY CLINIC | Age: 42
End: 2017-10-10

## 2017-10-10 DIAGNOSIS — Z79.01 LONG TERM CURRENT USE OF ANTICOAGULANT THERAPY: ICD-10-CM

## 2017-10-10 DIAGNOSIS — I48.4 ATYPICAL ATRIAL FLUTTER (HCC): Primary | ICD-10-CM

## 2017-10-10 LAB
INR BLD: 2.1
PT POC: 25.6 SECONDS
VALID INTERNAL CONTROL?: YES

## 2017-10-10 NOTE — PROGRESS NOTES
Verbal order and read back per Kenny Miner NP  The INR is stable and therapeutic.  Continue same dose of coumadin and recheck in 9 days  Continue Coumadin 5mg daily except 2.5mg on Sat  Patient informed of instructions, read back and verbalized understanding Jenna De La Cruz LPN

## 2017-10-11 NOTE — H&P
Seen & examined. See full H&P/notes for details. Plan ROCAEL & possible cardioversion. All risks, benefits, alternatives discussed. All questions answered. Risks included but are not limited to pain, infection, bleeding, stroke, perforation, valve damage, nerve damage, diaphragmatic paralysis, rupture of esophagus and possible fistula from heart to esophagus, deep venous thrombosis, emergent open heart surgery, and death both during procedure and post-operatively. Diagnosis:  Primary cardiologist Dr. Hoa Gilbert  -Recurrent symptomatic atrial flutter w/RVR, recently started Toprol.  -Initiation of Coumadin with therapeutic INR 2.7 October 2017, on Coumadin < 4 weeks.  -Paroxysmal atrial flutter with difficult to control rates and right sided figure eight ablation 6/2013 by Dr. Bart Ledesma. No clinical recurrence since September 2017.  -History of mild to moderate mitral stenosis in sinus rhythm, but severe when in atrial flutter.  -History of remote mitral valve ring.   -History of VSD repair.   -History of congestive heart failure with atrial flutter, but now compensated. -Nuclear stress test in 03/2014 without ischemia. Carla Richardson presents today for follow-up after his Toprol XL was increased to 50mg BID. He came to the office the other with complaints of palpitations and the return of atrial flutter. He noticed immediately when his heart rhythm changed as he had sudden onset of fatigue and weakness in his legs. With the elevated heart rate, he noticed dyspnea on exertion. An EKG was done that day and his heart rate was in the 120's.   He was asked to return today for follow-up.     He is a 42year old  male with history of paroxysmal atrial flutter with difficult to control rates (s/p right sided figure 8 ablation in June 2013 by Dr. Bart Ledesma), mild to moderate stenosis when he is in sinus rhythm and severe when in atrial flutter, history of mitral valve repair (ring), history of VSD repair. He was last seen by Dr. Noble Carlson in early July and up until earlier this week, he had no clinical recurrence of atrial flutter. His last echo was done in July 2017, which showed an EF of 50-55%, possible mild hypokinesis of the basal mid anteroseptal walls, grade 2 diastolic dysfunction, \"bounce motion\" in the ventricular septum. Patch closing of the VSD was intact. His last stress test was done in March 2014 which was negative for ischemia.     He denies chest pain, tightness, heaviness, and palpitations. He admits to a \"dull ache\" in his chest when his rate is rapid. Denies shortness of breath at rest, dyspnea on exertion, orthopnea and PND. Denies abdominal bloating. Denies lightheadedness, dizziness, and syncope. Denies lower extremity edema and claudication. Denies nausea, vomiting, diarrhea, melena, hematochezia. Denies hematuria, urgency, frequency. Denies fever, chills. He admits to weakness/fatigue in his legs when he is in atrial flutter.        PMH:       Past Medical History:   Diagnosis Date    Anxiety      Atrial flutter with rapid ventricular response [facility]) August 2013     Status post cardioversion    Cardiac Holter exam 07/20/2015     Normal 48-hr Holter. Sinus rhythm, avg HR 78 bpm.  No ventricular ectopy. Very rare supraventricular ectopy.  Cardiac nuclear imaging test 03/18/2014     No ischemia or prior infarction. No RWMA. EF 49%. Nondiagnostic EKG on max EST due to abnormal baseline. Ex time 13 min 5 sec.  Cardiac transesophageal echocardiography (ROCAEL) 11/25/2013     EF 35%. Mod diffuse hypk. Marked LAE. Dilated LA appendage w/no thrombus. No L-R or R-L atrial septal shunt by contrast.  Marked TYSON. MV annular ring prosthesis. Mod MS. Mild MR.       Cardiomyopathy ([address]       EF 40-45%    Herniated disc      Mitral valve stenosis, moderate      S/P mitral valve repair       severe MR    S/P VSD repair      Vitamin D deficiency 12/10/2014       PSH:        Past Surgical History:   Procedure Laterality Date    HX AFIB ABLATION   2014     Flutter/fib    HX HEART VALVE SURGERY   1992     Dr. Дмитрий Michael         MEDS:       Current Outpatient Prescriptions   Medication Sig    metoprolol succinate (TOPROL-XL) 50 mg XL tablet Take 1 Tab by mouth two (2) times a day.  busPIRone (BUSPAR) 15 mg tablet TAKE ONE TABLET BY MOUTH TWICE DAILY    valACYclovir (VALTREX) 1 gram tablet Take 1 Tab by mouth three (3) times daily.  aspirin delayed-release 81 mg tablet Take  by mouth daily.  cholecalciferol (VITAMIN D3) 1,000 unit tablet Take 1 Tab by mouth daily. (Patient taking differently: Take 1,000 Units by mouth daily. 2 tabs daily)      No current facility-administered medications for this visit.             Allergies and Sensitivities:  No Known Allergies     Family History:        Family History   Problem Relation Age of Onset    Diabetes Maternal Grandmother           Social History:  He  reports that he has never smoked. He has never used smokeless tobacco.  He  reports that he drinks alcohol.        Physical:       Visit Vitals    BP 98/74    Pulse (!) 123    Ht 5' 10\" (1.778 m)    Wt 87.1 kg (192 lb)    SpO2 98%    BMI 27.55 kg/m2         His weight is unchanged since his last visit.        Exam:  Neck:  Supple, no JVD, no carotid bruits  CV:  Normal S1 and  S2, no murmurs, rubs, or gallops noted  Lungs:  Clear to ausculation throughout, no wheezes or rales  Abd:  Soft, non-tender, non-distended with good bowel sounds.   No hepatosplenomegaly  Extremities:  No edema        Data:  EKG:        LABS:        Lab Results   Component Value Date/Time     Sodium 138 11/02/2016 12:00 AM     Potassium 4.2 11/02/2016 12:00 AM     Chloride 99 11/02/2016 12:00 AM     CO2 24 11/02/2016 12:00 AM     Glucose 77 11/02/2016 12:00 AM     BUN 15 11/02/2016 12:00 AM     Creatinine 0.98 11/02/2016 12:00 AM            Lab Results   Component Value Date/Time     Cholesterol, total 164 11/02/2016 12:00 AM     HDL Cholesterol 41 11/02/2016 12:00 AM     LDL, calculated 108 11/02/2016 12:00 AM     Triglyceride 74 11/02/2016 12:00 AM            Lab Results   Component Value Date/Time     ALT (SGPT) 12 11/02/2016 12:00 AM            Impression/Plan:  1. Atrial flutter with difficult to control rates, s/p figure 8 ablation in 2013 by Dr. Yazmin Scales, atrial flutter has reoccurred  2. History of mitral valve repair (ring)  3. History of VSD repair, patch intact (echo done July 2017)     Mr. Christophe White was seen today for follow-up after his Toprol XL was increased to 50mg BID after he went back into atrial flutter with difficult to control rates earlier this week. His EKG today shows that he remains in atrial flutter with heart rate in the 120's. He states that he knew he went back into it as he noticed fatigue and weakness in his legs. He does not notice any palpitations and is generally unaware of his heart rate. When seen by Dr. Leopoldo Garces in July 2017, he was doing well without clinical recurrence of atrial flutter.     His EKG was reviewed by Dr. Leopoldo Garces who recommends re-initiation of Coumadin therapy, ROCAEL/cardioversion, and re-evaluation by Dr. Yazmin Scales. He will be started on Coumadin 5mg daily except 2.5mg on Saturday and he will return for a protime on 10/10/17. His baseline INR today was 0.9 with a protime of 11.3. He has been scheduled for ROCAEL/cardioversion on 10/13/17, to be performed by Dr. Leopoldo Garces. Pre-procedure instructions were reviewed with him by our .       He denies chest pain but admits to a \"dull ache\" in his chest since he went back into atrial flutter. He denies shortness of breath. He does notice some fatigue when walking since going back into atrial flutter. His breath sounds are clear and he exhibits no signs of volume overload. His blood pressure is in the upper 90's and he denies lightheadedness and dizziness.   He states that normally his blood pressure is in the 97'T and 58'W systolic.     If he becomes symptomatic with the atrial flutter, I.e. Chest pain, dyspnea, edema; he is to go to the ER for further evaluation and treatment.   His wife was present in the room with him today and their questions were answered.     He will follow-up with Dr. Debra Harris as scheduled and as needed.  [address] MSN, FNP-BC     Please note:  Portions of this chart were created with Dragon medical speech to text program.  Unrecognized errors may be present.         Electronically signed by Flora Phillip NP at 10/05/17 [phone]

## 2017-10-12 LAB
BUN SERPL-MCNC: 17 MG/DL (ref 6–24)
BUN/CREAT SERPL: 17 (ref 9–20)
CALCIUM SERPL-MCNC: 9.3 MG/DL (ref 8.7–10.2)
CHLORIDE SERPL-SCNC: 103 MMOL/L (ref 96–106)
CO2 SERPL-SCNC: 23 MMOL/L (ref 18–29)
CREAT SERPL-MCNC: 0.99 MG/DL (ref 0.76–1.27)
GLUCOSE SERPL-MCNC: 118 MG/DL (ref 65–99)
POTASSIUM SERPL-SCNC: 4.2 MMOL/L (ref 3.5–5.2)
SODIUM SERPL-SCNC: 142 MMOL/L (ref 134–144)

## 2017-10-13 ENCOUNTER — ANESTHESIA EVENT (OUTPATIENT)
Dept: NON INVASIVE DIAGNOSTICS | Age: 42
End: 2017-10-13
Payer: COMMERCIAL

## 2017-10-13 ENCOUNTER — ANESTHESIA (OUTPATIENT)
Dept: NON INVASIVE DIAGNOSTICS | Age: 42
End: 2017-10-13
Payer: COMMERCIAL

## 2017-10-13 ENCOUNTER — HOSPITAL ENCOUNTER (OUTPATIENT)
Dept: NON INVASIVE DIAGNOSTICS | Age: 42
Discharge: HOME | End: 2017-10-13
Attending: INTERNAL MEDICINE | Admitting: INTERNAL MEDICINE
Payer: COMMERCIAL

## 2017-10-13 VITALS
BODY MASS INDEX: 27.2 KG/M2 | WEIGHT: 190 LBS | HEART RATE: 72 BPM | DIASTOLIC BLOOD PRESSURE: 57 MMHG | RESPIRATION RATE: 16 BRPM | OXYGEN SATURATION: 98 % | SYSTOLIC BLOOD PRESSURE: 106 MMHG | HEIGHT: 70 IN

## 2017-10-13 VITALS
OXYGEN SATURATION: 97 % | DIASTOLIC BLOOD PRESSURE: 55 MMHG | RESPIRATION RATE: 14 BRPM | HEART RATE: 67 BPM | SYSTOLIC BLOOD PRESSURE: 90 MMHG

## 2017-10-13 LAB
ANION GAP SERPL CALC-SCNC: 5 MMOL/L (ref 3–18)
APTT PPP: 39.9 SEC (ref 23–36.4)
ATRIAL RATE: 65 BPM
BUN SERPL-MCNC: 22 MG/DL (ref 7–18)
BUN/CREAT SERPL: 20 (ref 12–20)
CALCIUM SERPL-MCNC: 8.2 MG/DL (ref 8.5–10.1)
CALCULATED P AXIS, ECG09: -10 DEGREES
CALCULATED R AXIS, ECG10: 58 DEGREES
CALCULATED T AXIS, ECG11: 8 DEGREES
CHLORIDE SERPL-SCNC: 110 MMOL/L (ref 100–108)
CO2 SERPL-SCNC: 26 MMOL/L (ref 21–32)
CREAT SERPL-MCNC: 1.12 MG/DL (ref 0.6–1.3)
DIAGNOSIS, 93000: NORMAL
ERYTHROCYTE [DISTWIDTH] IN BLOOD BY AUTOMATED COUNT: 12.8 % (ref 11.6–14.5)
GLUCOSE SERPL-MCNC: 98 MG/DL (ref 74–99)
HCT VFR BLD AUTO: 42 % (ref 36–48)
HGB BLD-MCNC: 14.5 G/DL (ref 13–16)
INR PPP: 2.7 (ref 0.8–1.2)
MCH RBC QN AUTO: 31.6 PG (ref 24–34)
MCHC RBC AUTO-ENTMCNC: 34.5 G/DL (ref 31–37)
MCV RBC AUTO: 91.5 FL (ref 74–97)
P-R INTERVAL, ECG05: 158 MS
PLATELET # BLD AUTO: 148 K/UL (ref 135–420)
PMV BLD AUTO: 10.3 FL (ref 9.2–11.8)
POTASSIUM SERPL-SCNC: 4.8 MMOL/L (ref 3.5–5.5)
PROTHROMBIN TIME: 27.9 SEC (ref 11.5–15.2)
Q-T INTERVAL, ECG07: 454 MS
QRS DURATION, ECG06: 148 MS
QTC CALCULATION (BEZET), ECG08: 472 MS
RBC # BLD AUTO: 4.59 M/UL (ref 4.7–5.5)
SODIUM SERPL-SCNC: 141 MMOL/L (ref 136–145)
VENTRICULAR RATE, ECG03: 65 BPM
WBC # BLD AUTO: 5.9 K/UL (ref 4.6–13.2)

## 2017-10-13 PROCEDURE — 93005 ELECTROCARDIOGRAM TRACING: CPT

## 2017-10-13 PROCEDURE — 76060000031 HC ANESTHESIA FIRST 0.5 HR

## 2017-10-13 PROCEDURE — 85730 THROMBOPLASTIN TIME PARTIAL: CPT | Performed by: INTERNAL MEDICINE

## 2017-10-13 PROCEDURE — 85610 PROTHROMBIN TIME: CPT | Performed by: INTERNAL MEDICINE

## 2017-10-13 PROCEDURE — 74011250636 HC RX REV CODE- 250/636

## 2017-10-13 PROCEDURE — 85027 COMPLETE CBC AUTOMATED: CPT | Performed by: INTERNAL MEDICINE

## 2017-10-13 PROCEDURE — 93312 ECHO TRANSESOPHAGEAL: CPT

## 2017-10-13 PROCEDURE — 74011000258 HC RX REV CODE- 258

## 2017-10-13 PROCEDURE — 80048 BASIC METABOLIC PNL TOTAL CA: CPT | Performed by: INTERNAL MEDICINE

## 2017-10-13 RX ORDER — PROPOFOL 10 MG/ML
INJECTION, EMULSION INTRAVENOUS AS NEEDED
Status: DISCONTINUED | OUTPATIENT
Start: 2017-10-13 | End: 2017-10-13 | Stop reason: HOSPADM

## 2017-10-13 RX ORDER — SODIUM CHLORIDE 450 MG/100ML
INJECTION, SOLUTION INTRAVENOUS
Status: DISCONTINUED | OUTPATIENT
Start: 2017-10-13 | End: 2017-10-13 | Stop reason: HOSPADM

## 2017-10-13 RX ADMIN — PROPOFOL 30 MG: 10 INJECTION, EMULSION INTRAVENOUS at 11:18

## 2017-10-13 RX ADMIN — PROPOFOL 50 MG: 10 INJECTION, EMULSION INTRAVENOUS at 11:23

## 2017-10-13 RX ADMIN — PROPOFOL 50 MG: 10 INJECTION, EMULSION INTRAVENOUS at 11:16

## 2017-10-13 RX ADMIN — SODIUM CHLORIDE: 450 INJECTION, SOLUTION INTRAVENOUS at 11:12

## 2017-10-13 NOTE — DISCHARGE INSTRUCTIONS
PATIENT INSTRUCTIONS:    After general anesthesia or intravenous sedation, for 24 hours or while taking prescription Narcotics:  · Limit your activities  · Do not drive and operate hazardous machinery  · Do not make important personal or business decisions  · Do  not drink alcoholic beverages  · If you have not urinated within 8 hours after discharge, please contact your surgeon on call. Please give a list of your current medications to your Primary Care Provider. *  Please update this list whenever your medications are discontinued, doses are      changed, or new medications (including over-the-counter products) are added. *  Please carry medication information at all times in case of emergency situations. These are general instructions for a healthy lifestyle:    No smoking/ No tobacco products/ Avoid exposure to second hand smoke    Surgeon General's Warning:  Quitting smoking now greatly reduces serious risk to your health. Obesity, smoking, and sedentary lifestyle greatly increases your risk for illness    A healthy diet, regular physical exercise & weight monitoring are important for maintaining a healthy lifestyle    You may be retaining fluid if you have a history of heart failure or if you experience any of the following symptoms:  Weight gain of 3 pounds or more overnight or 5 pounds in a week, increased swelling in our hands or feet or shortness of breath while lying flat in bed. Please call your doctor as soon as you notice any of these symptoms; do not wait until your next office visit. Recognize signs and symptoms of STROKE:    F-face looks uneven    A-arms unable to move or move unevenly    S-speech slurred or non-existent    T-time-call 911 as soon as signs and symptoms begin-DO NOT go       Back to bed or wait to see if you get better-TIME IS BRAIN. Warning Signs of HEART ATTACK     Call 911 if you have these symptoms:   Chest discomfort.  Most heart attacks involve discomfort in the center of the chest that lasts more than a few minutes, or that goes away and comes back. It can feel like uncomfortable pressure, squeezing, fullness, or pain.  Discomfort in other areas of the upper body. Symptoms can include pain or discomfort in one or both arms, the back, neck, jaw, or stomach.  Shortness of breath with or without chest discomfort.  Other signs may include breaking out in a cold sweat, nausea, or lightheadedness. Don't wait more than five minutes to call 911 - MINUTES MATTER! Fast action can save your life. Calling 911 is almost always the fastest way to get lifesaving treatment. Emergency Medical Services staff can begin treatment when they arrive -- up to an hour sooner than if someone gets to the hospital by car. The discharge information has been reviewed with the patient and spouse. The patient and spouse verbalized understanding. Discharge medications reviewed with the patient and spouse and appropriate educational materials and side effects teaching were provided. Electrical Cardioversion: What to Expect at Home  Your Recovery    Electrical cardioversion is a treatment for an abnormal heartbeat, such as atrial fibrillation, supraventricular tachycardia, or ventricular tachycardia (VT). It uses a brief electrical shock to reset your heart's rhythm. After cardioversion, you may have redness, like a sunburn, where the patches were. The medicines you got to make you sleepy may make you feel drowsy for the rest of the day. Your doctor may have you take medicines to help the heart beat normally and to prevent blood clots. This care sheet gives you a general idea about how long it will take for you to recover. But each person recovers at a different pace. Follow the steps below to feel better as quickly as possible. How can you care for yourself at home? Medicines-Continue Coumadin  · Be safe with medicines.  Take your medicines exactly as prescribed. Call your doctor if you think you are having a problem with your medicine. You may take one or more of the following medicines:  ¨ Rate-control medicines to slow the heart rate. These include beta-blockers, calcium channel blockers, and digoxin. ¨ Rhythm control medicines that help the heart keep a normal rhythm. ¨ Blood thinners, also called anticoagulants, which help prevent blood clots. Be sure you know how to take your medicines safely. · Do not take any vitamins, over-the-counter medicines, or herbal products without talking to your doctor first.  Exercise  · Start light exercise if your doctor says that it is okay. Even a small amount will help you get stronger, have more energy, and manage your stress. Walking is an easy way to get exercise. Start out by walking a little more than you did in the hospital. Bit by bit, increase the amount you walk. · When you exercise, watch for signs that your heart is working too hard. You are pushing too hard if you cannot talk while you are exercising. If you become short of breath or dizzy or have chest pain, sit down and rest right away. · Check your pulse regularly. Place two fingers on the artery at the palm side of your wrist in line with your thumb. If your heartbeat seems uneven or fast, talk to your doctor. Other instructions  · No driving for 24 hours  · Do not smoke. If you need help quitting, talk to your doctor about stop-smoking programs and medicines. These can increase your chances of quitting for good. · Limit alcohol. Follow-up care is a key part of your treatment and safety. Be sure to make and go to all appointments, and call your doctor if you are having problems. It's also a good idea to know your test results and keep a list of the medicines you take. When should you call for help? Call 911 anytime you think you may need emergency care. For example, call if:  · You have chest pain or pressure.  This may occur with:  ¨ Sweating. ¨ Shortness of breath. ¨ Nausea or vomiting. ¨ Pain that spreads from the chest to the neck, jaw, or one or both shoulders or arms. ¨ A fast or uneven pulse. After calling 911, the  may tell you to chew 1 adult-strength or 2 to 4 low-dose aspirin. Wait for an ambulance. Do not try to drive yourself. · You have symptoms of a stroke. These may include:  ¨ Sudden numbness, tingling, weakness, or loss of movement in your face, arm, or leg, especially on only one side of your body. ¨ Sudden vision changes. ¨ Sudden trouble speaking. ¨ Sudden confusion or trouble understanding simple statements. ¨ Sudden problems with walking or balance. ¨ A sudden, severe headache that is different from past headaches. · You vomit blood or what looks like coffee grounds. · You pass maroon or very bloody stools. · You passed out (lost consciousness). Call your doctor now or seek immediate medical care if:  · You feel dizzy or lightheaded, or you feel like you may faint. · Your heart rate becomes irregular. · You have shortness of breath. · You have any unusual bleeding, such as:  ¨ Bruises or blood spots under the skin. ¨ A nosebleed that you cannot stop. ¨ Bleeding gums when you brush your teeth. ¨ Blood in your urine. ¨ Vaginal bleeding when you are not having your period, or heavy period bleeding. ¨ Your stools are black and tarlike or have streaks of blood. Watch closely for any changes in your health, and be sure to contact your doctor if:  · You do not get better as expected. Where can you learn more? Go to http://alonso.info/. Enter A617 in the search box to learn more about \"Electrical Cardioversion: What to Expect at Home. \"

## 2017-10-13 NOTE — PROGRESS NOTES
Pt brought to [place] [id number] ambulatory. Pt placed in bay 19 and connected to monitor. Baseline VS taken and PIV started x 1. Admission database completed. pts chest and back clipped. Hooked to both monitors. Pt ready for ordered procedure.

## 2017-10-13 NOTE — IP AVS SNAPSHOT
Nimco Narvaez 
 
 
 [address] Patient: Carla Richardson MRN: [id number] :1975 You are allergic to the following No active allergies Recent Documentation Height Weight BMI Smoking Status 1.778 m 86.2 kg 27.26 kg/m2 Never Smoker Emergency Contacts Name Discharge Info Relation Home Work Mobile [place] 57 CAREGIVER [3] Spouse [3] [phone] About your hospitalization You were admitted on:  2017 You last received care in the:  [facility] CATH HOLDING You were discharged on:  2017 Unit phone number:  [phone] Why you were hospitalized Your primary diagnosis was:  Not on File Providers Seen During Your Hospitalizations Provider Role Specialty Primary office phone Sim Olson MD Attending Provider Cardiology [phone] Your Primary Care Physician (PCP) Primary Care Physician Office Phone Office Fax Aruna Vivas [phone] Follow-up Information Follow up With Details Comments Contact Info Traci Garibay DO   [address] [facility] [address] [facility]  [address] [address] 
[phone] Sim Olson MD In 1 week FOLLOW UP VISIT Appointment in: Other (Specify) Continue Coumadin and INR check in  My office in 1 week. Keep appointment with DR. Bart Ledesma scheduled for 2017 [address] [facility] [address] [facility] [address] [place] [address] 
[phone] Your Appointments  10:20 AM EDT Follow Up with Sim Olson MD  
[facility] ([address]) [address] [address] [phone]  10:20 AM EDT ANTICOAG NURSE with Pt Inr Hv Csi  
 [facility] ([address]) [place] [address] [address] [address] [phone] Friday October 20, 2017 11:45 AM EDT  
ECHO ROCAEL with [facility] 1 [facility] NON-INVASIVE CARD [facility] [place] [address] [place] [address], [address] [id number] [phone] APPOINTMENTS SCHEDULED BEFORE 3:00PM  Please arrive in the [facility] and check in with the registrar 15 minutes prior to your scheduled appointment time. This is the same entrance as the [facility], facing [address]. [facility] Parking: turn off [address] onto [address]. Proceed one block and make a right onto [address] [place] will be on your left). Go to the end of [address] and parking is located on your left. APPOINTMENTS SCHEDULED AT 3:00PM OR LATER:  Registration in the [address] [place] [address] CLOSES at 3:00 p.m. Patients should report to Patient Access/Admitting located on the left after entering the MAIN entrance of [facility]. Patient should plan to arrive 30 minutes prior to their appointment time if going to Patient Access/Admitting. After test, patient may exit from the [address] [place] [address] or [facility] Entrance. Current Discharge Medication List  
  
CONTINUE these medications which have CHANGED Dose & Instructions Dispensing Information Comments Morning Noon Evening Bedtime  
 cholecalciferol 1,000 unit tablet Commonly known as:  VITAMIN D3 What changed:  additional instructions Your last dose was: Your next dose is:    
   
   
 Dose:  1000 Units Take 1 Tab by mouth daily. Quantity:  30 Tab Refills:  3 CONTINUE these medications which have NOT CHANGED Dose & Instructions Dispensing Information Comments Morning Noon Evening Bedtime  
 aspirin delayed-release 81 mg tablet Your last dose was: Your next dose is: Take  by mouth daily. Refills:  0  
     
   
   
   
  
 busPIRone 15 mg tablet Commonly known as:  BUSPAR Your last dose was: Your next dose is: TAKE ONE TABLET BY MOUTH TWICE DAILY Quantity:  180 Tab Refills:  0  
     
   
   
   
  
 metoprolol succinate 50 mg XL tablet Commonly known as:  TOPROL-XL Your last dose was: Your next dose is:    
   
   
 Dose:  50 mg Take 1 Tab by mouth two (2) times a day. Quantity:  60 Tab Refills:  4  
     
   
   
   
  
 valACYclovir 1 gram tablet Commonly known as:  VALTREX Your last dose was: Your next dose is:    
   
   
 Dose:  1000 mg Take 1 Tab by mouth three (3) times daily. Quantity:  30 Tab Refills:  5  
     
   
   
   
  
 warfarin 5 mg tablet Commonly known as:  COUMADIN Your last dose was: Your next dose is:    
   
   
 Dose:  5 mg Take 1 Tab by mouth daily. Or as directed Quantity:  45 Tab Refills:  4 Discharge Instructions PATIENT INSTRUCTIONS: 
 
After general anesthesia or intravenous sedation, for 24 hours or while taking prescription Narcotics: · Limit your activities · Do not drive and operate hazardous machinery · Do not make important personal or business decisions · Do  not drink alcoholic beverages · If you have not urinated within 8 hours after discharge, please contact your surgeon on call. Please give a list of your current medications to your Primary Care Provider. *  Please update this list whenever your medications are discontinued, doses are 
    changed, or new medications (including over-the-counter products) are added. *  Please carry medication information at all times in case of emergency situations. These are general instructions for a healthy lifestyle: No smoking/ No tobacco products/ Avoid exposure to second hand smoke Surgeon General's Warning:  Quitting smoking now greatly reduces serious risk to your health. Obesity, smoking, and sedentary lifestyle greatly increases your risk for illness A healthy diet, regular physical exercise & weight monitoring are important for maintaining a healthy lifestyle You may be retaining fluid if you have a history of heart failure or if you experience any of the following symptoms:  Weight gain of 3 pounds or more overnight or 5 pounds in a week, increased swelling in our hands or feet or shortness of breath while lying flat in bed. Please call your doctor as soon as you notice any of these symptoms; do not wait until your next office visit. Recognize signs and symptoms of STROKE: 
 
F-face looks uneven A-arms unable to move or move unevenly S-speech slurred or non-existent T-time-call 911 as soon as signs and symptoms begin-DO NOT go Back to bed or wait to see if you get better-TIME IS BRAIN. Warning Signs of HEART ATTACK Call 911 if you have these symptoms: 
? Chest discomfort. Most heart attacks involve discomfort in the center of the chest that lasts more than a few minutes, or that goes away and comes back. It can feel like uncomfortable pressure, squeezing, fullness, or pain. ? Discomfort in other areas of the upper body. Symptoms can include pain or discomfort in one or both arms, the back, neck, jaw, or stomach. ? Shortness of breath with or without chest discomfort. ? Other signs may include breaking out in a cold sweat, nausea, or lightheadedness. Don't wait more than five minutes to call [address]! Fast action can save your life. Calling 911 is almost always the fastest way to get lifesaving treatment. Emergency Medical Services staff can begin treatment when they arrive  up to an hour sooner than if someone gets to the hospital by car. The discharge information has been reviewed with the patient and spouse. The patient and spouse verbalized understanding. Discharge medications reviewed with the patient and spouse and appropriate educational materials and side effects teaching were provided. Electrical Cardioversion: What to Expect at [facility] Your Recovery Electrical cardioversion is a treatment for an abnormal heartbeat, such as atrial fibrillation, supraventricular tachycardia, or ventricular tachycardia (VT). It uses a brief electrical shock to reset your heart's rhythm. After cardioversion, you may have redness, like a sunburn, where the patches were. The medicines you got to make you sleepy may make you feel drowsy for the rest of the day. Your doctor may have you take medicines to help the heart beat normally and to prevent blood clots. This care sheet gives you a general idea about how long it will take for you to recover. But each person recovers at a different pace. Follow the steps below to feel better as quickly as possible. How can you care for yourself at home? Medicines-Continue Coumadin · Be safe with medicines. Take your medicines exactly as prescribed. Call your doctor if you think you are having a problem with your medicine. You may take one or more of the following medicines: 
¨ Rate-control medicines to slow the heart rate. These include beta-blockers, calcium channel blockers, and digoxin. ¨ Rhythm control medicines that help the heart keep a normal rhythm. ¨ Blood thinners, also called anticoagulants, which help prevent blood clots. Be sure you know how to take your medicines safely. · Do not take any vitamins, over-the-counter medicines, or herbal products without talking to your doctor first. 
Exercise · Start light exercise if your doctor says that it is okay. Even a small amount will help you get stronger, have more energy, and manage your stress. Walking is an easy way to get exercise.  Start out by walking a little more than you did in the hospital. Bit by bit, increase the amount you walk. · When you exercise, watch for signs that your heart is working too hard. You are pushing too hard if you cannot talk while you are exercising. If you become short of breath or dizzy or have chest pain, sit down and rest right away. · Check your pulse regularly. Place two fingers on the artery at the palm side of your wrist in line with your thumb. If your heartbeat seems uneven or fast, talk to your doctor. Other instructions · No driving for 24 hours · Do not smoke. If you need help quitting, talk to your doctor about stop-smoking programs and medicines. These can increase your chances of quitting for good. · Limit alcohol. Follow-up care is a key part of your treatment and safety. Be sure to make and go to all appointments, and call your doctor if you are having problems. It's also a good idea to know your test results and keep a list of the medicines you take. When should you call for help? Call 911 anytime you think you may need emergency care. For example, call if: 
· You have chest pain or pressure. This may occur with: ¨ Sweating. ¨ Shortness of breath. ¨ Nausea or vomiting. ¨ Pain that spreads from the chest to the neck, jaw, or one or both shoulders or arms. ¨ A fast or uneven pulse. After calling 911, the  may tell you to chew 1 adult-strength or 2 to 4 low-dose aspirin. Wait for an ambulance. Do not try to drive yourself. · You have symptoms of a stroke. These may include: 
¨ Sudden numbness, tingling, weakness, or loss of movement in your face, arm, or leg, especially on only one side of your body. ¨ Sudden vision changes. ¨ Sudden trouble speaking. ¨ Sudden confusion or trouble understanding simple statements. ¨ Sudden problems with walking or balance. ¨ A sudden, severe headache that is different from past headaches. · You vomit blood or what looks like coffee grounds. · You pass maroon or very bloody stools. · You passed out (lost consciousness). Call your doctor now or seek immediate medical care if: 
· You feel dizzy or lightheaded, or you feel like you may faint. · Your heart rate becomes irregular. · You have shortness of breath. · You have any unusual bleeding, such as: ¨ Bruises or blood spots under the skin. ¨ A nosebleed that you cannot stop. ¨ Bleeding gums when you brush your teeth. ¨ Blood in your urine. ¨ Vaginal bleeding when you are not having your period, or heavy period bleeding. ¨ Your stools are black and tarlike or have streaks of blood. Watch closely for any changes in your health, and be sure to contact your doctor if: 
· You do not get better as expected. Where can you learn more? Go to http://jorge l-xin.info/. Enter A617 in the search box to learn more about \"Electrical Cardioversion: What to Expect at Home. \" Discharge Orders None Introducing [facility] SERVICES! Dear Na Ohs: Thank you for requesting a Sinch account. Our records indicate that you already have an active Sinch account. You can access your account anytime at [email or web address] Access [email or web address] Camera [email or web address] Access Technology Did you know that you can access your hospital and ER discharge instructions at any time in Sinch? You can also review all of your test results from your hospital stay or ER visit. Additional Information If you have questions, please visit the Frequently Asked Questions section of the Sinch website at [email or web address] Remember, Sinch is NOT to be used for urgent needs. For medical emergencies, dial 911. Now available from your iPhone and Android! General Information Please provide this summary of care documentation to your next provider. Patient Signature:  ____________________________________________________________ Date:  ____________________________________________________________  
  
Jennifer Hero Provider Signature:  ____________________________________________________________ Date:  ____________________________________________________________

## 2017-10-13 NOTE — PROGRESS NOTES
Pt provided discharge instructions. All questions answered and pt and spouse verbalized understanding. PIV removed. Pt escorted to front of building for discharge.  Patient armband removed and shredded

## 2017-10-13 NOTE — PROCEDURES
Procedures:  1. Synchronized external cardioversion x 1 with sinus rhythm at 60 bpm.  2.  Deep sedation with propofol and anesthesia assistance. 3.  Transesophageal echocardiogram prior to procedure without evidence of left atrial appendage thrombus (see separate response). Diagnosis:     Diagnosis:  Primary cardiologist Dr. Jonnathan Stoddard  -Recurrent symptomatic atrial flutter w/RVR, recently started Toprol.  -Initiation of Coumadin with therapeutic INR 2.7 October 2017, on Coumadin < 4 weeks.  -Paroxysmal atrial flutter with difficult to control rates and right sided figure eight ablation 6/2013 by Dr. Willie Garcia. No clinical recurrence since September 2017.  -History of mild to moderate mitral stenosis in sinus rhythm, but severe when in atrial flutter.  -History of remote mitral valve ring.   -History of VSD repair.   -History of congestive heart failure with atrial flutter, but now compensated. -Nuclear stress test in 03/2014 without ischemia. Details:  After informed consent was obtained the patient was adequately sedated. Transesophageal echocardiogram prior to procedure was without evidence of left atrial appendage thrombus. Initial rhythm was atrial flutter with variable AV block. A 100J external synchronized cardioversion was performed with restoration of sinus rhythm at 65 bpm.  The patient awoke without complication. Plan:  Discharge home later today. No driving for 24 hours. Follow-up with Dr. Willie Garcia (scheduled for Oct 30th), to discuss redo ablation. Continue beta-blocker and Coumadin, discussed with patient/wife.

## 2017-10-13 NOTE — ANESTHESIA POSTPROCEDURE EVALUATION
Post-Anesthesia Evaluation and Assessment    Patient: Yanet Redman MRN: [id number]  SSN: [id number]    YOB: 1975  Age: 42 y.o. Sex: male       Cardiovascular Function/Vital Signs  Visit Vitals    BP 93/55    Pulse 66    Resp 14    Ht 5' 10\" (1.778 m)    Wt 86.2 kg (190 lb)    SpO2 99%    BMI 27.26 kg/m2       Patient is status post MAC anesthesia for * No procedures listed *. Nausea/Vomiting: None    Postoperative hydration reviewed and adequate. Pain:  Pain Scale 1: Numeric (0 - 10) (10/13/17 1042)  Pain Intensity 1: 0 (10/13/17 1042)   Managed    Neurological Status: At baseline    Mental Status and Level of Consciousness: Arousable    Pulmonary Status:   O2 Device: Room air (10/13/17 1130)   Adequate oxygenation and airway patent    Complications related to anesthesia: None    Post-anesthesia assessment completed.  No concerns    Signed By: Kiana Monroy MD     October 13, 2017

## 2017-10-13 NOTE — ANESTHESIA PREPROCEDURE EVALUATION
Anesthetic History   No history of anesthetic complications            Review of Systems / Medical History  Patient summary reviewed and pertinent labs reviewed    Pulmonary  Within defined limits                 Neuro/Psych   Within defined limits           Cardiovascular            Dysrhythmias : atrial flutter      Exercise tolerance: >4 METS  Comments: S/P MVR sand VSD repair at age 15. GI/Hepatic/Renal  Within defined limits              Endo/Other  Within defined limits           Other Findings   Comments:   Risk Factors for Postoperative nausea/vomiting:       History of postoperative nausea/vomiting? NO       Female? NO       Motion sickness? NO       Intended opioid administration for postoperative analgesia? NO      Smoking Abstinence  Current Smoker? NO  Elective Surgery? YES  Seen preoperatively by anesthesiologist or proxy prior to day of surgery? YES  Pt abstained from smoking 24 hours prior to anesthesia?  N/A           Physical Exam    Airway  Mallampati: II  TM Distance: 4 - 6 cm  Neck ROM: normal range of motion   Mouth opening: Normal     Cardiovascular    Rhythm: irregular           Dental  No notable dental hx       Pulmonary  Breath sounds clear to auscultation               Abdominal         Other Findings            Anesthetic Plan    ASA: 3            Induction: Intravenous  Anesthetic plan and risks discussed with: Patient

## 2017-10-19 ENCOUNTER — ANTI-COAG VISIT (OUTPATIENT)
Dept: CARDIOLOGY CLINIC | Age: 42
End: 2017-10-19

## 2017-10-19 ENCOUNTER — OFFICE VISIT (OUTPATIENT)
Dept: CARDIOLOGY CLINIC | Age: 42
End: 2017-10-19

## 2017-10-19 VITALS
DIASTOLIC BLOOD PRESSURE: 60 MMHG | BODY MASS INDEX: 28.63 KG/M2 | OXYGEN SATURATION: 97 % | HEIGHT: 70 IN | HEART RATE: 65 BPM | WEIGHT: 200 LBS | SYSTOLIC BLOOD PRESSURE: 98 MMHG

## 2017-10-19 DIAGNOSIS — Z79.01 LONG TERM CURRENT USE OF ANTICOAGULANT THERAPY: ICD-10-CM

## 2017-10-19 DIAGNOSIS — Z87.74 S/P VSD REPAIR: ICD-10-CM

## 2017-10-19 DIAGNOSIS — Z98.890 S/P MITRAL VALVE REPAIR: ICD-10-CM

## 2017-10-19 DIAGNOSIS — Z86.79 S/P ABLATION OF ATRIAL FLUTTER: ICD-10-CM

## 2017-10-19 DIAGNOSIS — Z98.890 S/P ABLATION OF ATRIAL FLUTTER: ICD-10-CM

## 2017-10-19 DIAGNOSIS — I48.4 ATYPICAL ATRIAL FLUTTER (HCC): ICD-10-CM

## 2017-10-19 DIAGNOSIS — Z98.890 HISTORY OF CARDIOVERSION: ICD-10-CM

## 2017-10-19 DIAGNOSIS — I48.4 ATYPICAL ATRIAL FLUTTER (HCC): Primary | ICD-10-CM

## 2017-10-19 LAB
INR BLD: 4.7
PT POC: 56.4 SECONDS
VALID INTERNAL CONTROL?: YES

## 2017-10-19 NOTE — PROGRESS NOTES
The INR is above the therapeutic range. Ask the patient about bleeding complications. Please make the following adjustments to Coumadin dosing: Hold today, 2.5mg x 1 then decrease Coumadin to 5mg daily except 2.5mg on Tues & Sat  Repeat the INR in 1 week.

## 2017-10-19 NOTE — MR AVS SNAPSHOT
Visit Information Date & Time Provider Department Dept. Phone Encounter #  
 10/19/2017 10:20 AM Cecilia Tobar MD [facility] [id number] Follow-up Instructions Follow-up and Disposition History Your Appointments 10/25/2017  9:20 AM  
ANTICOAG NURSE with Pt Inr Hv Csi [facility] [place] [facility]"" ([facility]) Appt Note: 1 week INR  
 [place] [address] [address] [address] [phone] [address] [phone]  
  
    
 1/11/2018  1:20 PM  
Follow Up with Cecilia Tobar MD  
[facility] \[facility] [place] [facility]\"" ([facility]) Appt Note: 2-3 month f/up [facility] [address] [place] [address] [phone] [address] [address]  
  
    
 7/5/2018  4:00 PM  
Follow Up with Cecilia Tobar MD  
[facility] \[facility] [place] [facility]\"" ([facility]) Appt Note: [facility] year [facility] [address] [place] [address] [phone] Upcoming Health Maintenance Date Due DTaP/Tdap/Td series (1 - Tdap) 10/30/1995 INFLUENZA AGE 9 TO ADULT 8/1/2017 Pneumococcal 19-64 Medium Risk (1 of 1 - PPSV23) 11/10/2017* *Topic was postponed. The date shown is not the original due date. Allergies as of 10/19/2017  Review Complete On: 10/19/2017 By: Cecilia Tobar MD  
 No Known Allergies Current Immunizations  Never Reviewed Name Date Influenza Vaccine 10/14/2016 Influenza Vaccine (Quad) PF 9/2/2015 Not reviewed this visit You Were Diagnosed With   
  
 Codes Comments Atypical atrial flutter (HCC)    -  Primary ICD-10-CM: I48.4 ICD-9-CM: 427.32 S/P VSD repair     ICD-10-CM: Z98.890, Z87.74 ICD-9-CM: V45.89 S/P mitral valve repair     ICD-10-CM: U26.131 ICD-9-CM: V45.89   
 S/P ablation of atrial flutter     ICD-10-CM: Z98.890, Z86.79 
ICD-9-CM: V45.89 History of cardioversion     ICD-10-CM: Z98.890 ICD-9-CM: V15.1 Vitals BP Pulse Height(growth percentile) Weight(growth percentile) SpO2 BMI  
 98/60 65 5' 10\" (1.778 m) 200 lb (90.7 kg) 97% 28.7 kg/m2 Smoking Status Never Smoker Vitals History BMI and BSA Data Body Mass Index Body Surface Area 28.7 kg/m 2 2.12 m 2 Preferred Pharmacy Pharmacy Name Phone [facility] [address] [place], [address] Your Updated Medication List  
  
   
This list is accurate as of: 10/19/17 10:49 AM.  Always use your most recent med list.  
  
  
  
  
 aspirin delayed-release 81 mg tablet Take  by mouth daily. busPIRone 15 mg tablet Commonly known as:  BUSPAR  
TAKE ONE TABLET BY MOUTH TWICE DAILY  
  
 cholecalciferol 1,000 unit tablet Commonly known as:  VITAMIN D3 Take 1 Tab by mouth daily. metoprolol succinate 50 mg XL tablet Commonly known as:  TOPROL-XL Take 1 Tab by mouth two (2) times a day. valACYclovir 1 gram tablet Commonly known as:  VALTREX Take 1 Tab by mouth three (3) times daily. warfarin 5 mg tablet Commonly known as:  COUMADIN Take 1 Tab by mouth daily. Or as directed We Performed the Following AMB POC EKG ROUTINE W/ 12 LEADS, INTER & REP [30642 CPT(R)] Introducing hospitals & HEALTH SERVICES! Dear Keo Philip: Thank you for requesting a SprainGo account. Our records indicate that you already have an active SprainGo account. You can access your account anytime at [email or web address] Did you know that you can access your hospital and ER discharge instructions at any time in SprainGo? You can also review all of your test results from your hospital stay or ER visit. Additional Information If you have questions, please visit the Frequently Asked Questions section of the Fuz[employer] Online Game Information Technologyhart website at [email or web address] Remember, [employer]i [employer] is NOT to be used for urgent needs. For medical emergencies, dial 911. Now available from your iPhone and Android! Please provide this summary of care documentation to your next provider. Your primary care clinician is listed as Ramy Camacho. If you have any questions after today's visit, please call [phone].

## 2017-10-19 NOTE — PROGRESS NOTES
1. Have you been to the ER, urgent care clinic since your last visit? Hospitalized since your last visit? Yes, 10/13/17 for cardioversion     2. Have you seen or consulted any other health care providers outside of the [address] [place] [address] since your last visit? Include any pap smears or colon screening.  No

## 2017-10-19 NOTE — PROGRESS NOTES
History of Present Illness:  A 42 y.o. male here for follow up. He underwent transesophageal echocardiogram and cardioversion 10/13/17 due to recurrent atrial flutter. He had a history of atypical flutter that recently occurred. He tells me today that this started soon after he was doing some diving for his boat and doing some cleaning. He denies any chest pain, dyspnea, PND, orthopnea, edema. He has been feeling better. He is on Coumadin, INR is 4.7 today. I will go ahead and titrate that. Impression/Plan:    Paroxysmal atrial flutter with difficult to control rates and right sided figure eight ablation 6/2013 by Dr. Shayna Lino. Recent recurrence of atrial flutter with cardioversion 10/13/17. Re-initiation of Coumadin with close monitoring. History of mild to moderate mitral stenosis in sinus rhythm, but moderate to severe when in atrial flutter. History of remote mitral valve ring. History of remote VSD repair. History of congestive heart failure with atrial flutter, now compensated and converted. Nuclear stress test in 03/2014 without ischemia. He is back in sinus rhythm. He is scheduled to see Dr. Shayna Lino at the end of the month. I would lean toward being more aggressive with him since he is quite young and is very symptomatic when he does go into flutter. I have attempted a previous ablation that was unsuccessful, but Dr. Shayna Lino did a very good job and he has maintained sinus rhythm since 2013. I will leave the final decision up to him. He has underlying right bundle branch block. All questions answered. I will see him back in the next 2-3 months.         Past Medical History:   Diagnosis Date    Anxiety     Atrial flutter with rapid ventricular response [facility]) August 2013    Status post cardioversion    Cardiac Holter exam 07/20/2015    Normal 48-hr Holter. Sinus rhythm, avg HR 78 bpm.  No ventricular ectopy. Very rare supraventricular ectopy.     Cardiac nuclear imaging test 03/18/2014    No ischemia or prior infarction. No RWMA. EF 49%. Nondiagnostic EKG on max EST due to abnormal baseline. Ex time 13 min 5 sec.  Cardiac transesophageal echocardiography (ROCAEL) 11/25/2013    EF 35%. Mod diffuse hypk. Marked LAE. Dilated LA appendage w/no thrombus. No L-R or R-L atrial septal shunt by contrast.  Marked TYSON. MV annular ring prosthesis. Mod MS. Mild MR.  Cardiomyopathy ([address]     EF 40-45%    Herniated disc     Mitral valve stenosis, moderate     S/P mitral valve repair     severe MR    S/P VSD repair     Vitamin D deficiency 12/10/2014       Current Outpatient Prescriptions   Medication Sig Dispense Refill    metoprolol succinate (TOPROL-XL) 50 mg XL tablet Take 1 Tab by mouth two (2) times a day. 60 Tab 4    warfarin (COUMADIN) 5 mg tablet Take 1 Tab by mouth daily. Or as directed 45 Tab 4    busPIRone (BUSPAR) 15 mg tablet TAKE ONE TABLET BY MOUTH TWICE DAILY 180 Tab 0    valACYclovir (VALTREX) 1 gram tablet Take 1 Tab by mouth three (3) times daily. 30 Tab 5    aspirin delayed-release 81 mg tablet Take  by mouth daily.  cholecalciferol (VITAMIN D3) 1,000 unit tablet Take 1 Tab by mouth daily. (Patient taking differently: Take 1,000 Units by mouth daily. 2 tabs daily) 30 Tab 3       Social History   reports that he has never smoked. He has never used smokeless tobacco.   reports that he drinks alcohol. Family History  family history includes Diabetes in his maternal grandmother. Review of Systems  Except as stated above include:  Constitutional: Negative for fever, chills and malaise/fatigue. HEENT: No congestion or recent URI. Gastrointestinal: No nausea, vomiting, abdominal pain, bloody stools. Pulmonary:  Negative except as stated above. Cardiac:  Negative except as stated above. Musculoskeletal: Negative except as stated above. Neurological:  No localized symptoms. Skin:  Negative except as stated above.   Psych:  No mood swings. Endocrine:  No heat/cold intolerance. PHYSICAL EXAM  BP Readings from Last 3 Encounters:   10/19/17 98/60   10/13/17 106/57   10/13/17 90/55     Pulse Readings from Last 3 Encounters:   10/19/17 65   10/13/17 72   10/13/17 67     Wt Readings from Last 3 Encounters:   10/19/17 90.7 kg (200 lb)   10/13/17 86.2 kg (190 lb)   10/05/17 87.1 kg (192 lb)     General:   Well developed, well groomed. Head/Neck:   No jugular venous distention     No carotid bruits. No evidence of xanthelasma. Lungs:   No respiratory distress. Clear bilaterally. Heart:    Regular rate and rhythm. Normal S1/S2. Palpation of heart with normal point of maximum impulse. Diastolic apical murmur. Abdomen:   Soft and nontender. No palpable abdominal mass or bruits. Extremities:   Intact peripheral pulses. No significant edema. Neurological:   Alert and oriented to person, place, time. No focal neurological deficit visually.     Blood Pressure Metric:  Controlled

## 2017-10-30 DIAGNOSIS — F41.9 ANXIETY: ICD-10-CM

## 2017-10-30 RX ORDER — BUSPIRONE HYDROCHLORIDE 15 MG/1
15 TABLET ORAL 2 TIMES DAILY
Qty: 60 TAB | Refills: 0 | Status: SHIPPED | OUTPATIENT
Start: 2017-10-30 | End: 2017-11-27 | Stop reason: SDUPTHER

## 2017-10-30 RX ORDER — BUSPIRONE HYDROCHLORIDE 15 MG/1
TABLET ORAL
Qty: 180 TAB | Refills: 0 | OUTPATIENT
Start: 2017-10-30

## 2017-10-30 RX ORDER — BUSPIRONE HYDROCHLORIDE 15 MG/1
15 TABLET ORAL 2 TIMES DAILY
Qty: 60 TAB | Refills: 0 | Status: SHIPPED | OUTPATIENT
Start: 2017-10-30 | End: 2017-10-30 | Stop reason: SDUPTHER

## 2017-11-01 NOTE — TELEPHONE ENCOUNTER
Spoke with patient informed him that a month supply was called in.  He needs an appt to continue care with us, stated he would call back this evening for an appt./th

## 2017-11-03 ENCOUNTER — ANTI-COAG VISIT (OUTPATIENT)
Dept: CARDIOLOGY CLINIC | Age: 42
End: 2017-11-03

## 2017-11-03 DIAGNOSIS — Z79.01 LONG TERM CURRENT USE OF ANTICOAGULANT THERAPY: ICD-10-CM

## 2017-11-03 DIAGNOSIS — I48.4 ATYPICAL ATRIAL FLUTTER (HCC): Primary | ICD-10-CM

## 2017-11-03 LAB
INR BLD: 4.6
PT POC: 55.4 SECONDS
VALID INTERNAL CONTROL?: YES

## 2017-11-03 NOTE — PROGRESS NOTES
Verbal order and read back per Bianca Fay NP  The INR is above the therapeutic range. Ask the patient about bleeding complications. Please make the following adjustments to Coumadin dosing:Hold today then decrease dose to 2.5 mg daily except 5 mg on Mon, Wed and Fri  Repeat the INR in 1 week. Patient informed of instructions, read back and verbalized understanding. Dosing calendar also provided.  Yosef Wade LPN

## 2017-11-16 ENCOUNTER — CLINICAL SUPPORT (OUTPATIENT)
Dept: CARDIOLOGY CLINIC | Age: 42
End: 2017-11-16

## 2017-11-16 DIAGNOSIS — I48.4 ATYPICAL ATRIAL FLUTTER (HCC): Primary | ICD-10-CM

## 2017-11-16 DIAGNOSIS — Z79.01 LONG TERM CURRENT USE OF ANTICOAGULANT THERAPY: ICD-10-CM

## 2017-11-16 LAB
INR BLD: 1.9
PT POC: 22.6 SECONDS
VALID INTERNAL CONTROL?: YES

## 2017-11-27 ENCOUNTER — OFFICE VISIT (OUTPATIENT)
Dept: FAMILY MEDICINE CLINIC | Age: 42
End: 2017-11-27

## 2017-11-27 VITALS
TEMPERATURE: 97.6 F | RESPIRATION RATE: 16 BRPM | OXYGEN SATURATION: 98 % | SYSTOLIC BLOOD PRESSURE: 101 MMHG | HEIGHT: 70 IN | BODY MASS INDEX: 28.35 KG/M2 | HEART RATE: 92 BPM | WEIGHT: 198 LBS | DIASTOLIC BLOOD PRESSURE: 74 MMHG

## 2017-11-27 DIAGNOSIS — Z79.899 ENCOUNTER FOR LONG-TERM (CURRENT) USE OF MEDICATIONS: ICD-10-CM

## 2017-11-27 DIAGNOSIS — M25.522 ELBOW PAIN, LEFT: Primary | ICD-10-CM

## 2017-11-27 DIAGNOSIS — F41.9 ANXIETY: ICD-10-CM

## 2017-11-27 RX ORDER — PREDNISONE 20 MG/1
20 TABLET ORAL
Qty: 7 TAB | Refills: 0 | Status: SHIPPED | OUTPATIENT
Start: 2017-11-27 | End: 2020-07-16

## 2017-11-27 RX ORDER — BUSPIRONE HYDROCHLORIDE 15 MG/1
15 TABLET ORAL 2 TIMES DAILY
Qty: 180 TAB | Refills: 0 | Status: SHIPPED | OUTPATIENT
Start: 2017-11-27 | End: 2018-02-21 | Stop reason: SDUPTHER

## 2017-11-27 RX ORDER — MELOXICAM 15 MG/1
15 TABLET ORAL DAILY
Qty: 30 TAB | Refills: 0 | Status: SHIPPED | OUTPATIENT
Start: 2017-11-27 | End: 2020-07-16

## 2017-11-27 NOTE — PATIENT INSTRUCTIONS
A Healthy Lifestyle: Care Instructions  Your Care Instructions    A healthy lifestyle can help you feel good, stay at a healthy weight, and have plenty of energy for both work and play. A healthy lifestyle is something you can share with your whole family. A healthy lifestyle also can lower your risk for serious health problems, such as high blood pressure, heart disease, and diabetes. You can follow a few steps listed below to improve your health and the health of your family. Follow-up care is a key part of your treatment and safety. Be sure to make and go to all appointments, and call your doctor if you are having problems. It's also a good idea to know your test results and keep a list of the medicines you take. How can you care for yourself at home? · Do not eat too much sugar, fat, or fast foods. You can still have dessert and treats now and then. The goal is moderation. · Start small to improve your eating habits. Pay attention to portion sizes, drink less juice and soda pop, and eat more fruits and vegetables. ¨ Eat a healthy amount of food. A 3-ounce serving of meat, for example, is about the size of a deck of cards. Fill the rest of your plate with vegetables and whole grains. ¨ Limit the amount of soda and sports drinks you have every day. Drink more water when you are thirsty. ¨ Eat at least 5 servings of fruits and vegetables every day. It may seem like a lot, but it is not hard to reach this goal. A serving or helping is 1 piece of fruit, 1 cup of vegetables, or 2 cups of leafy, raw vegetables. Have an apple or some carrot sticks as an afternoon snack instead of a candy bar. Try to have fruits and/or vegetables at every meal.  · Make exercise part of your daily routine. You may want to start with simple activities, such as walking, bicycling, or slow swimming. Try to be active 30 to 60 minutes every day. You do not need to do all 30 to 60 minutes all at once.  For example, you can exercise 3 times a day for 10 or 20 minutes. Moderate exercise is safe for most people, but it is always a good idea to talk to your doctor before starting an exercise program.  · Keep moving. Leo Mikeicks the lawn, work in the garden, or [facility] Take the stairs instead of the elevator at work. · If you smoke, quit. People who smoke have an increased risk for heart attack, stroke, cancer, and other lung illnesses. Quitting is hard, but there are ways to boost your chance of quitting tobacco for good. ¨ Use nicotine gum, patches, or lozenges. ¨ Ask your doctor about stop-smoking programs and medicines. ¨ Keep trying. In addition to reducing your risk of diseases in the future, you will notice some benefits soon after you stop using tobacco. If you have shortness of breath or asthma symptoms, they will likely get better within a few weeks after you quit. · Limit how much alcohol you drink. Moderate amounts of alcohol (up to 2 drinks a day for men, 1 drink a day for women) are okay. But drinking too much can lead to liver problems, high blood pressure, and other health problems. Family health  If you have a family, there are many things you can do together to improve your health. · Eat meals together as a family as often as possible. · Eat healthy foods. This includes fruits, vegetables, lean meats and dairy, and whole grains. · Include your family in your fitness plan. Most people think of activities such as jogging or tennis as the way to fitness, but there are many ways you and your family can be more active. Anything that makes you breathe hard and gets your heart pumping is exercise. Here are some tips:  ¨ Walk to do errands or to take your child to school or the bus. ¨ Go for a family bike ride after dinner instead of watching TV. Where can you learn more? Go to http://jorge l-xin.info/. Enter U937 in the search box to learn more about \"A Healthy Lifestyle: Care Instructions. \"  Current as of: May 12, 2017  Content Version: 11.4  © 8465-1461 [employer]. Care instructions adapted under license by [employer] (which disclaims liability or warranty for this information). If you have questions about a medical condition or this instruction, always ask your healthcare professional. [address] any warranty or liability for your use of this information. Elbow: Exercises  Your Care Instructions  Here are some examples of exercises for elbows. Start each exercise slowly. Ease off the exercise if you start to have pain. Your doctor or physical therapist will tell you when you can start these exercises and which ones will work best for you. How to do the exercises  Wrist flexor stretch    1. Extend your arm in front of you with your palm up. 2. Bend your wrist, pointing your hand toward the floor. 3. With your other hand, gently bend your wrist farther until you feel a mild to moderate stretch in your forearm. 4. Hold for at least 15 to 30 seconds. Repeat 2 to 4 times. Wrist extensor stretch    1. Repeat steps 1 to 4 of the stretch above but begin with your extended hand palm down. Ball or sock squeeze    1. Hold a tennis ball (or a rolled-up sock) in your hand. 2. Make a fist around the ball (or sock) and squeeze. 3. Hold for about 6 seconds, and then relax for up to 10 seconds. 4. Repeat 8 to 12 times. 5. Switch the ball (or sock) to your other hand and do 8 to 12 times. Wrist deviation    1. Sit so that your arm is supported but your hand hangs off the edge of a flat surface, such as a table. 2. Hold your hand out like you are shaking hands with someone. 3. Move your hand up and down. 4. Repeat this motion 8 to 12 times. 5. Switch arms. 6. Try to do this exercise twice with each hand. Wrist curls    1. Place your forearm on a table with your hand hanging over the edge of the table, palm up. 2. Place a 1- to 2-pound weight in your hand. This may be a dumbbell, a can of food, or a filled water bottle. 3. Slowly raise and lower the weight while keeping your forearm on the table and your palm facing up. 4. Repeat this motion 8 to 12 times. 5. Switch arms, and do steps 1 through 4.  6. Repeat with your hand facing down toward the floor. Switch arms. Biceps curls    1. Sit leaning forward with your legs slightly spread and your left hand on your left thigh. 2. Place your right elbow on your right thigh, and hold the weight with your forearm horizontal.  3. Slowly curl the weight up and toward your chest.  4. Repeat this motion 8 to 12 times. 5. Switch arms, and do steps 1 through 4. Follow-up care is a key part of your treatment and safety. Be sure to make and go to all appointments, and call your doctor if you are having problems. It's also a good idea to know your test results and keep a list of the medicines you take. Where can you learn more? Go to http://jorge l-xin.info/. Enter M279 in the search box to learn more about \"Elbow: Exercises. \"  Current as of: March 21, 2017  Content Version: 11.4  © 5670-7591 [employer]. Care instructions adapted under license by [employer] (which disclaims liability or warranty for this information). If you have questions about a medical condition or this instruction, always ask your healthcare professional. [place] [address] any warranty or liability for your use of this information.

## 2017-11-27 NOTE — PROGRESS NOTES
Chief Complaint   Patient presents with    Medication Refill       HPI:  Patient is a 36year old male presents today requesting refill of Buspirone and also endorsed recent left elbow pain for which he denies recent fall, trauma, or injury worse with heavy lifting. Past Medical History:   Diagnosis Date    Anxiety     Atrial flutter with rapid ventricular response [facility]) August 2013    Status post cardioversion    Cardiac Holter exam 07/20/2015    Normal 48-hr Holter. Sinus rhythm, avg HR 78 bpm.  No ventricular ectopy. Very rare supraventricular ectopy.  Cardiac nuclear imaging test 03/18/2014    No ischemia or prior infarction. No RWMA. EF 49%. Nondiagnostic EKG on max EST due to abnormal baseline. Ex time 13 min 5 sec.  Cardiac transesophageal echocardiography (ROCAEL) 11/25/2013    EF 35%. Mod diffuse hypk. Marked LAE. Dilated LA appendage w/no thrombus. No L-R or R-L atrial septal shunt by contrast.  Marked TYSON. MV annular ring prosthesis. Mod MS. Mild MR.  Cardiomyopathy ([address]     EF 40-45%    Herniated disc     Mitral valve stenosis, moderate     S/P mitral valve repair     severe MR    S/P VSD repair     Vitamin D deficiency 12/10/2014     No Known Allergies    Current Outpatient Prescriptions   Medication Sig Dispense Refill    predniSONE (DELTASONE) 20 mg tablet Take 1 Tab by mouth daily (with breakfast). 7 Tab 0    meloxicam (MOBIC) 15 mg tablet Take 1 Tab by mouth daily. 30 Tab 0    warfarin (COUMADIN) 5 mg tablet Take 1 Tab by mouth daily. Or as directed 45 Tab 4    valACYclovir (VALTREX) 1 gram tablet Take 1 Tab by mouth three (3) times daily. 30 Tab 5    aspirin delayed-release 81 mg tablet Take  by mouth daily.  cholecalciferol (VITAMIN D3) 1,000 unit tablet Take 1 Tab by mouth daily. (Patient taking differently: Take 1,000 Units by mouth daily.  2 tabs daily) 30 Tab 3    metoprolol succinate (TOPROL-XL) 50 mg XL tablet Take 1 Tab by mouth two (2) times a day. 60 Tab 6    busPIRone (BUSPAR) 15 mg tablet TAKE ONE TABLET BY MOUTH TWICE DAILY 180 Tab 0          ROS:  Pertinent as in HPI        Physical Exam:  Visit Vitals    /74 (BP 1 Location: Left arm, BP Patient Position: Sitting)    Pulse 92    Temp 97.6 °F (36.4 °C) (Oral)    Resp 16    Ht 5' 10\" (1.778 m)    Wt 198 lb (89.8 kg)    SpO2 98%    BMI 28.41 kg/m2     General: a & o x 3, afebrile, well-nourished, interacting appropriately, in no acute distress  Respiratory: symmetrical chest expansion, lung sounds clear bilaterally  Cardiovascular: normal S1S2, regular rate and rhythm  Musculoskeletal: Mild tenderness left elbow      Assessment/Plan:    ICD-10-CM ICD-9-CM    1. Elbow pain, left M25.522 719.42 predniSONE (DELTASONE) 20 mg tablet      meloxicam (MOBIC) 15 mg tablet   2. Anxiety F41.9 300.00 DISCONTINUED: busPIRone (BUSPAR) 15 mg tablet   3. Encounter for long-term (current) use of medications Z79.899 V58.69 TSH 3RD GENERATION      T4, FREE         Orders Placed This Encounter    TSH 3RD GENERATION     Standing Status:   Future     Number of Occurrences:   1     Standing Expiration Date:   3/27/2018    T4, FREE     Standing Status:   Future     Number of Occurrences:   1     Standing Expiration Date:   5/26/2018    DISCONTD: busPIRone (BUSPAR) 15 mg tablet     Sig: Take 1 Tab by mouth two (2) times a day. Dispense:  180 Tab     Refill:  0    predniSONE (DELTASONE) 20 mg tablet     Sig: Take 1 Tab by mouth daily (with breakfast). Dispense:  7 Tab     Refill:  0    meloxicam (MOBIC) 15 mg tablet     Sig: Take 1 Tab by mouth daily. Dispense:  30 Tab     Refill:  0         Additional Notes: Discussed today's diagnosis, treatment plans. Discussed medication indications and side effects. After Visit Summary: Discussed provided printed patient instructions. Answered questions accordingly.   Follow-up Disposition: As previously scheduled with PCP        Franklyn Petersen, DO, MPH  Internal Medicine

## 2017-11-27 NOTE — MR AVS SNAPSHOT
Visit Information Date & Time Provider Department Dept. Phone Encounter #  
 11/27/2017  4:45 PM Andrew Fordrogen [phone] [facility] [id number] Follow-up Instructions Return in about 3 months (around 2/27/2018) for Labs 1 week before. Your Appointments 12/1/2017  8:40 AM  
ANTICOAG NURSE with Pt Inr Hv Csi [facility] ([facility]) Appt Note: 2 week INR  
 [place] [id number]  
[phone] [facility] 53 [phone]  
  
    
 1/11/2018  1:20 PM  
Follow Up with Carlitos Epps MD  
[facility] ([facility]) Appt Note: 2-3 month f/up [place] [id number]  
[phone] [address] [place] [address] E [address]  
  
    
 7/5/2018  4:00 PM  
Follow Up with Carlitos Epps MD  
[facility] ([facility]) Appt Note: 1 year follow up Ivan Mari [facility] [phone] Upcoming Health Maintenance Date Due DTaP/Tdap/Td series (1 - Tdap) 10/30/1995 Pneumococcal 19-64 Medium Risk (1 of 1 - PPSV23) 1/19/2018* *Topic was postponed. The date shown is not the original due date. Allergies as of 11/27/2017  Review Complete On: 11/27/2017 By: Christo Harper LPN No Known Allergies Current Immunizations  Reviewed on 11/27/2017 Name Date Influenza Vaccine 10/18/2017, 10/14/2016 Influenza Vaccine (Quad) PF 9/2/2015 Reviewed by Danuta Tello DO on 11/27/2017 at  5:26 PM  
You Were Diagnosed With   
  
 Codes Comments Elbow pain, left    -  Primary ICD-10-CM: H78.261 ICD-9-CM: 719.42 Anxiety     ICD-10-CM: F41.9 ICD-9-CM: 300.00 Encounter for long-term (current) use of medications     ICD-10-CM: Z79.899 ICD-9-CM: V58.69 Vitals BP Pulse Temp Resp Height(growth percentile) Weight(growth percentile) 101/74 (BP 1 Location: Left arm, BP Patient Position: Sitting) 92 97.6 °F (36.4 °C) (Oral) 16 5' 10\" (1.778 m) 198 lb (89.8 kg) SpO2 BMI Smoking Status 98% 28.41 kg/m2 Never Smoker BMI and BSA Data Body Mass Index Body Surface Area  
 28.41 kg/m 2 2.11 m 2 Preferred Pharmacy Pharmacy Name Phone [facility] [address] [place] [address] Your Updated Medication List  
  
   
This list is accurate as of: 11/27/17  5:29 PM.  Always use your most recent med list.  
  
  
  
  
 aspirin delayed-release 81 mg tablet Take  by mouth daily. busPIRone 15 mg tablet Commonly known as:  BUSPAR Take 1 Tab by mouth two (2) times a day. cholecalciferol 1,000 unit tablet Commonly known as:  VITAMIN D3 Take 1 Tab by mouth daily. meloxicam 15 mg tablet Commonly known as:  MOBIC Take 1 Tab by mouth daily. metoprolol succinate 50 mg XL tablet Commonly known as:  TOPROL-XL Take 1 Tab by mouth two (2) times a day. predniSONE 20 mg tablet Commonly known as:  Carron Cozier Take 1 Tab by mouth daily (with breakfast). valACYclovir 1 gram tablet Commonly known as:  VALTREX Take 1 Tab by mouth three (3) times daily. warfarin 5 mg tablet Commonly known as:  COUMADIN Take 1 Tab by mouth daily. Or as directed Prescriptions Sent to Pharmacy Refills  
 busPIRone (BUSPAR) 15 mg tablet 0 Sig: Take 1 Tab by mouth two (2) times a day. Class: Normal  
 Pharmacy: [address] [place] [address], [address] [place] [address] Ph #: [phone] Route: Oral  
 predniSONE (DELTASONE) 20 mg tablet 0 Sig: Take 1 Tab by mouth daily (with breakfast).   
 Class: Normal  
 Pharmacy: [address] [place] [address] [address] Ph #: [phone] Route: Oral  
 meloxicam (MOBIC) 15 mg tablet 0 Sig: Take 1 Tab by mouth daily. Class: Normal  
 Pharmacy: [address] [facility] Ph #: [phone] Route: Oral  
  
Follow-up Instructions Return in about 3 months (around 2/27/2018) for Labs 1 week before. To-Do List   
 02/25/2018 Lab:  T4, FREE   
  
 02/25/2018 Lab:  TSH 3RD GENERATION Patient Instructions A Healthy Lifestyle: Care Instructions Your Care Instructions A healthy lifestyle can help you feel good, stay at a healthy weight, and have plenty of energy for both work and play. A healthy lifestyle is something you can share with your whole family. A healthy lifestyle also can lower your risk for serious health problems, such as high blood pressure, heart disease, and diabetes. You can follow a few steps listed below to improve your health and the health of your family. Follow-up care is a key part of your treatment and safety. Be sure to make and go to all appointments, and call your doctor if you are having problems. It's also a good idea to know your test results and keep a list of the medicines you take. How can you care for yourself at home? · Do not eat too much sugar, fat, or fast foods. You can still have dessert and treats now and then. The goal is moderation. · Start small to improve your eating habits. Pay attention to portion sizes, drink less juice and soda pop, and eat more fruits and vegetables. ¨ Eat a healthy amount of food. A 3-ounce serving of meat, for example, is about the size of a deck of cards. Fill the rest of your plate with vegetables and whole grains. ¨ Limit the amount of soda and sports drinks you have every day. Drink more water when you are thirsty. ¨ Eat at least 5 servings of fruits and vegetables every day.  It may seem like a lot, but it is not hard to reach this goal. A serving or helping is 1 piece of fruit, 1 cup of vegetables, or 2 cups of leafy, raw vegetables. Have an apple or some carrot sticks as an afternoon snack instead of a candy bar. Try to have fruits and/or vegetables at every meal. 
· Make exercise part of your daily routine. You may want to start with simple activities, such as walking, bicycling, or slow swimming. Try to be active 30 to 60 minutes every day. You do not need to do all 30 to 60 minutes all at once. For example, you can exercise 3 times a day for 10 or 20 minutes. Moderate exercise is safe for most people, but it is always a good idea to talk to your doctor before starting an exercise program. 
· Keep moving. Goldie Ramirezry the lawn, work in the garden, or [place]. Take the stairs instead of the elevator at work. · If you smoke, quit. People who smoke have an increased risk for heart attack, stroke, cancer, and other lung illnesses. Quitting is hard, but there are ways to boost your chance of quitting tobacco for good. ¨ Use nicotine gum, patches, or lozenges. ¨ Ask your doctor about stop-smoking programs and medicines. ¨ Keep trying. In addition to reducing your risk of diseases in the future, you will notice some benefits soon after you stop using tobacco. If you have shortness of breath or asthma symptoms, they will likely get better within a few weeks after you quit. · Limit how much alcohol you drink. Moderate amounts of alcohol (up to 2 drinks a day for men, 1 drink a day for women) are okay. But drinking too much can lead to liver problems, high blood pressure, and other health problems. Family health If you have a family, there are many things you can do together to improve your health. · Eat meals together as a family as often as possible. · Eat healthy foods. This includes fruits, vegetables, lean meats and dairy, and whole grains. · Include your family in your fitness plan. Most people think of activities such as jogging or tennis as the way to fitness, but there are many ways you and your family can be more active. Anything that makes you breathe hard and gets your heart pumping is exercise. Here are some tips: 
¨ Walk to do errands or to take your child to school or the bus. ¨ Go for a family bike ride after dinner instead of watching TV. Where can you learn more? Go to http://jorge l-xin.info/. Enter U042 in the search box to learn more about \"A Healthy Lifestyle: Care Instructions. \" Current as of: May 12, 2017 Content Version: 11.4 © 6317-6093 Desall. Care instructions adapted under license by [employer] (which disclaims liability or warranty for this information). If you have questions about a medical condition or this instruction, always ask your healthcare professional. [place] [address] any warranty or liability for your use of this information. Elbow: Exercises Your Care Instructions Here are some examples of exercises for elbows. Start each exercise slowly. Ease off the exercise if you start to have pain. Your doctor or physical therapist will tell you when you can start these exercises and which ones will work best for you. How to do the exercises Wrist flexor stretch 1. Extend your arm in front of you with your palm up. 2. Bend your wrist, pointing your hand toward the floor. 3. With your other hand, gently bend your wrist farther until you feel a mild to moderate stretch in your forearm. 4. Hold for at least 15 to 30 seconds. Repeat 2 to 4 times. Wrist extensor stretch 1. Repeat steps 1 to 4 of the stretch above but begin with your extended hand palm down. Ball or sock squeeze 1. Hold a tennis ball (or a rolled-up sock) in your hand. 2. Make a fist around the ball (or sock) and squeeze. 3. Hold for about 6 seconds, and then relax for up to 10 seconds. 4. Repeat 8 to 12 times. 5. Switch the ball (or sock) to your other hand and do 8 to 12 times. Wrist deviation 1. Sit so that your arm is supported but your hand hangs off the edge of a flat surface, such as a table. 2. Hold your hand out like you are shaking hands with someone. 3. Move your hand up and down. 4. Repeat this motion 8 to 12 times. 5. Switch arms. 6. Try to do this exercise twice with each hand. Wrist curls 1. Place your forearm on a table with your hand hanging over the edge of the table, palm up. 2. Place a 1- to 2-pound weight in your hand. This may be a dumbbell, a can of food, or a filled water bottle. 3. Slowly raise and lower the weight while keeping your forearm on the table and your palm facing up. 4. Repeat this motion 8 to 12 times. 5. Switch arms, and do steps 1 through 4. 
6. Repeat with your hand facing down toward the floor. Switch arms. Biceps curls 1. Sit leaning forward with your legs slightly spread and your left hand on your left thigh. 2. Place your right elbow on your right thigh, and hold the weight with your forearm horizontal. 
3. Slowly curl the weight up and toward your chest. 
4. Repeat this motion 8 to 12 times. 5. Switch arms, and do steps 1 through 4. Follow-up care is a key part of your treatment and safety. Be sure to make and go to all appointments, and call your doctor if you are having problems. It's also a good idea to know your test results and keep a list of the medicines you take. Where can you learn more? Go to http://jorge l-xin.info/. Enter M279 in the search box to learn more about \"Elbow: Exercises. \" Current as of: March 21, 2017 Content Version: 11.4 © 6756-1290 Healthwise, Incorporated.  Care instructions adapted under license by [employer] (which disclaims liability or warranty for this information). If you have questions about a medical condition or this instruction, always ask your healthcare professional. [address] any warranty or liability for your use of this information. Introducing hospitals & HEALTH SERVICES! Dear Cate Reid: Thank you for requesting a [employer] account. Our records indicate that you already have an active [employer] account. You can access your account anytime at [email or web address] Did you know that you can access your hospital and ER discharge instructions at any time in [employer]? You can also review all of your test results from your hospital stay or ER visit. Additional Information If you have questions, please visit the Frequently Asked Questions section of the [employer] website at [email or web address] Remember, [employer] is NOT to be used for urgent needs. For medical emergencies, dial 911. Now available from your iPhone and Android! Please provide this summary of care documentation to your next provider. If you have any questions after today's visit, please call [phone].

## 2017-12-01 ENCOUNTER — ANTI-COAG VISIT (OUTPATIENT)
Dept: CARDIOLOGY CLINIC | Age: 42
End: 2017-12-01

## 2017-12-01 DIAGNOSIS — Z79.01 LONG TERM CURRENT USE OF ANTICOAGULANT THERAPY: ICD-10-CM

## 2017-12-01 DIAGNOSIS — I48.4 ATYPICAL ATRIAL FLUTTER (HCC): Primary | ICD-10-CM

## 2017-12-01 LAB
INR BLD: 2.6
PT POC: 31.5 SECONDS
VALID INTERNAL CONTROL?: YES

## 2017-12-01 NOTE — PROGRESS NOTES
Verbal order and read back per Dr. Alondra Mcdermott  The INR is stable and therapeutic.  Continue same dose of coumadin and recheck in 3 weeks  Continue Coumadin 5 mg daily except 2.5 mg on Mon, Wed, and   Patient informed of instructions, read back and verbalized understanding Tatiana Marley LPN

## 2017-12-21 ENCOUNTER — ANTI-COAG VISIT (OUTPATIENT)
Dept: CARDIOLOGY CLINIC | Age: 42
End: 2017-12-21

## 2017-12-21 DIAGNOSIS — Z79.01 LONG TERM CURRENT USE OF ANTICOAGULANT THERAPY: ICD-10-CM

## 2017-12-21 DIAGNOSIS — I48.4 ATYPICAL ATRIAL FLUTTER (HCC): Primary | ICD-10-CM

## 2017-12-21 LAB
INR BLD: 2.6
PT POC: 31.8 SECONDS
VALID INTERNAL CONTROL?: YES

## 2017-12-21 NOTE — PROGRESS NOTES
Verbal order and read back per Mirna Serrano NP  The INR is stable and therapeutic. Continue same dose of coumadin and recheck in 1 month.   Continue Coumadin 5 mg daily except 2.5 mg on Mon, Wed, and Fri  Patient informed of instructions, read back and verbalized understanding Venecia Khan LPN

## 2018-01-05 ENCOUNTER — ANTI-COAG VISIT (OUTPATIENT)
Dept: CARDIOLOGY CLINIC | Age: 43
End: 2018-01-05

## 2018-01-05 DIAGNOSIS — Z79.01 LONG TERM CURRENT USE OF ANTICOAGULANT THERAPY: ICD-10-CM

## 2018-01-05 DIAGNOSIS — I48.4 ATYPICAL ATRIAL FLUTTER (HCC): ICD-10-CM

## 2018-01-05 LAB
INR BLD: 2.6
PT POC: 30.6 SECONDS
VALID INTERNAL CONTROL?: YES

## 2018-01-05 NOTE — PROGRESS NOTES
PAtient is in range. PAtient will continue with current medication dosage. Patient will  need to recheck his INR next week on Wednesday due to upcoming ablation. Dr Willie Weathers NP) is out of the office today.

## 2018-01-10 ENCOUNTER — ANTI-COAG VISIT (OUTPATIENT)
Dept: CARDIOLOGY CLINIC | Age: 43
End: 2018-01-10

## 2018-01-10 DIAGNOSIS — I48.4 ATYPICAL ATRIAL FLUTTER (HCC): ICD-10-CM

## 2018-01-10 DIAGNOSIS — Z79.01 LONG TERM CURRENT USE OF ANTICOAGULANT THERAPY: ICD-10-CM

## 2018-01-10 LAB
INR BLD: 2.5
PT POC: 30 SECONDS
VALID INTERNAL CONTROL?: YES

## 2018-01-10 NOTE — PROGRESS NOTES
The INR is stable and therapeutic.   For ablation by Dr. Carlitos Carpio) on 1/12/18  Continue Coumadin 5 mg daily except 2.5 mg on Mon, Wed, and Fri   Recheck INR in 2 weeks

## 2018-01-30 ENCOUNTER — ANTI-COAG VISIT (OUTPATIENT)
Dept: CARDIOLOGY CLINIC | Age: 43
End: 2018-01-30

## 2018-01-30 DIAGNOSIS — I48.4 ATYPICAL ATRIAL FLUTTER (HCC): Primary | ICD-10-CM

## 2018-01-30 DIAGNOSIS — Z79.01 LONG TERM CURRENT USE OF ANTICOAGULANT THERAPY: ICD-10-CM

## 2018-01-30 LAB
INR BLD: 2.3
PT POC: 27.5 SECONDS
VALID INTERNAL CONTROL?: YES

## 2018-01-30 NOTE — PROGRESS NOTES
Verbal order and read back per Erika Sewell NP  The INR is stable and therapeutic. Continue same dose of coumadin and recheck in 1 month.   Continue Coumadin 5 mg daily except 2.5 mg on Mon, Wed, and Fri   Patient informed of instructions, read back and verbalized understanding Lisa Nolasco LPN

## 2018-02-25 DIAGNOSIS — Z79.899 ENCOUNTER FOR LONG-TERM (CURRENT) USE OF MEDICATIONS: ICD-10-CM

## 2018-02-28 ENCOUNTER — ANTI-COAG VISIT (OUTPATIENT)
Dept: CARDIOLOGY CLINIC | Age: 43
End: 2018-02-28

## 2018-02-28 DIAGNOSIS — Z79.01 LONG TERM CURRENT USE OF ANTICOAGULANT THERAPY: ICD-10-CM

## 2018-02-28 DIAGNOSIS — I48.4 ATYPICAL ATRIAL FLUTTER (HCC): ICD-10-CM

## 2018-02-28 LAB
INR BLD: 2.8
PT POC: 33.5 SECONDS
VALID INTERNAL CONTROL?: YES

## 2018-03-14 RX ORDER — METOPROLOL SUCCINATE 50 MG/1
50 TABLET, EXTENDED RELEASE ORAL 2 TIMES DAILY
Qty: 60 TAB | Refills: 6 | Status: SHIPPED | OUTPATIENT
Start: 2018-03-14 | End: 2019-01-15 | Stop reason: SDUPTHER

## 2018-03-28 ENCOUNTER — ANTI-COAG VISIT (OUTPATIENT)
Dept: CARDIOLOGY CLINIC | Age: 43
End: 2018-03-28

## 2018-03-28 DIAGNOSIS — Z79.01 LONG TERM CURRENT USE OF ANTICOAGULANT THERAPY: ICD-10-CM

## 2018-03-28 DIAGNOSIS — I48.4 ATYPICAL ATRIAL FLUTTER (HCC): ICD-10-CM

## 2018-03-28 LAB
INR BLD: 2.7
PT POC: 32.8 SECONDS
VALID INTERNAL CONTROL?: YES

## 2018-03-28 NOTE — PROGRESS NOTES
The INR is stable and therapeutic.    Continue Coumadin 5 mg daily except 2.5 mg on Mon, Wed, and Fri   Recheck INR in 4 weeks

## 2018-04-25 ENCOUNTER — ANTI-COAG VISIT (OUTPATIENT)
Dept: CARDIOLOGY CLINIC | Age: 43
End: 2018-04-25

## 2018-04-25 DIAGNOSIS — I48.4 ATYPICAL ATRIAL FLUTTER (HCC): ICD-10-CM

## 2018-04-25 DIAGNOSIS — Z79.01 LONG TERM CURRENT USE OF ANTICOAGULANT THERAPY: ICD-10-CM

## 2018-04-25 LAB
INR BLD: 2.6
PT POC: 31.3 SECONDS
VALID INTERNAL CONTROL?: YES

## 2018-05-01 RX ORDER — DOCOSANOL 100 MG/G
CREAM TOPICAL
Qty: 2 G | Refills: 0 | Status: SHIPPED | OUTPATIENT
Start: 2018-05-01 | End: 2020-07-16

## 2018-05-23 ENCOUNTER — ANTI-COAG VISIT (OUTPATIENT)
Dept: CARDIOLOGY CLINIC | Age: 43
End: 2018-05-23

## 2018-05-23 DIAGNOSIS — Z79.01 LONG TERM CURRENT USE OF ANTICOAGULANT THERAPY: ICD-10-CM

## 2018-05-23 DIAGNOSIS — I48.4 ATYPICAL ATRIAL FLUTTER (HCC): ICD-10-CM

## 2018-05-23 LAB
INR BLD: 3.9
PT POC: 46.9 SECONDS
VALID INTERNAL CONTROL?: YES

## 2018-06-20 ENCOUNTER — ANTI-COAG VISIT (OUTPATIENT)
Dept: CARDIOLOGY CLINIC | Age: 43
End: 2018-06-20

## 2018-06-20 DIAGNOSIS — I48.3 TYPICAL ATRIAL FLUTTER (HCC): ICD-10-CM

## 2018-06-20 DIAGNOSIS — Z79.01 LONG TERM CURRENT USE OF ANTICOAGULANT THERAPY: ICD-10-CM

## 2018-06-20 LAB
INR BLD: 3.8
PT POC: 45.7 SECONDS
VALID INTERNAL CONTROL?: YES

## 2018-06-22 NOTE — PROGRESS NOTES
The INR is above the therapeutic range.   Has not eaten many vegetables over the past week  Please make the following adjustments to Coumadin dosing: Hold today then continue Coumadin 5 mg daily except 2.5 mg on Mon, Wed, and Fri   Repeat the INR in 4 weeks Satisfactory

## 2018-07-05 ENCOUNTER — OFFICE VISIT (OUTPATIENT)
Dept: CARDIOLOGY CLINIC | Age: 43
End: 2018-07-05

## 2018-07-05 ENCOUNTER — ANTI-COAG VISIT (OUTPATIENT)
Dept: CARDIOLOGY CLINIC | Age: 43
End: 2018-07-05

## 2018-07-05 VITALS
HEIGHT: 70 IN | WEIGHT: 195 LBS | BODY MASS INDEX: 27.92 KG/M2 | SYSTOLIC BLOOD PRESSURE: 106 MMHG | OXYGEN SATURATION: 99 % | DIASTOLIC BLOOD PRESSURE: 60 MMHG | HEART RATE: 77 BPM

## 2018-07-05 DIAGNOSIS — Z98.890 S/P MITRAL VALVE REPAIR: ICD-10-CM

## 2018-07-05 DIAGNOSIS — Z79.01 LONG TERM CURRENT USE OF ANTICOAGULANT THERAPY: ICD-10-CM

## 2018-07-05 DIAGNOSIS — F41.9 ANXIETY: ICD-10-CM

## 2018-07-05 DIAGNOSIS — Z87.74 S/P VSD REPAIR: ICD-10-CM

## 2018-07-05 DIAGNOSIS — Z98.890 S/P ABLATION OF ATRIAL FLUTTER: Primary | ICD-10-CM

## 2018-07-05 DIAGNOSIS — Z86.79 S/P ABLATION OF ATRIAL FLUTTER: Primary | ICD-10-CM

## 2018-07-05 DIAGNOSIS — I48.3 TYPICAL ATRIAL FLUTTER (HCC): ICD-10-CM

## 2018-07-05 DIAGNOSIS — I48.3 TYPICAL ATRIAL FLUTTER (HCC): Primary | ICD-10-CM

## 2018-07-05 LAB
INR BLD: 3
PT POC: 36 SECONDS
VALID INTERNAL CONTROL?: YES

## 2018-07-05 NOTE — PROGRESS NOTES
1. Have you been to the ER, urgent care clinic since your last visit? Hospitalized since your last visit? No     2. Have you seen or consulted any other health care providers outside of the [address] [place] [address] since your last visit? Include any pap smears or colon screening.  No

## 2018-07-05 NOTE — PROGRESS NOTES
History of Present Illness: A 36 y.o. male here for follow up. He had recurrent atrial flutter with cardioversion October 2017. Dr. Jyoti Bradford performed a second ablation in January of this year. He has been doing well since then. He had a 30-day event monitor in follow up. I do not have the results, but he is assuming there have been no events. Clinically, he had been doing very well without events. His INR is 3.0 today. He wants to decrease Toprol if he can as he is afraid it may be affecting his memory a bit. He denies any new chest pain, dyspnea, presyncope, syncope, PND, orthopnea or edema.      Impression/Plan:    Paroxysmal atrial flutter atypical with right sided figure eight ablation 6/2013 by Dr. Jyoti Bradford, redo ablation January 2018. I cardioverted him October 2017. Chronic Coumadin which will be continued for now. History of mild to moderate mitral stenosis but moderate to severe when in atrial flutter. History of remote mitral valve ring. History of remote VSD repair. History of transient congestive heart failure with atrial flutter, now improved. Nuclear stress test in 03/2014 without ischemia. He is back in sinus rhythm and since seeing Dr. Jyoti Bradford he performed redo ablation. He has been doing well since then and is quite aware when he has recurrence. He has underlying  right bundle branch block which is unchanged. I will decrease Toprol from 50 mg twice daily to 25 mg twice daily due to concern for some memory issues. His blood pressure is controlled. I would continue Coumadin indefinitely at this point given two recurrences and high chance of other atrial arrhythmias. I will see back in one year. Past Medical History:   Diagnosis Date    Anxiety     Atrial flutter with rapid ventricular response [facility]) August 2013    Status post cardioversion    Cardiac Holter exam 07/20/2015    Normal 48-hr Holter. Sinus rhythm, avg HR 78 bpm.  No ventricular ectopy.   Very rare supraventricular ectopy.  Cardiac nuclear imaging test 03/18/2014    No ischemia or prior infarction. No RWMA. EF 49%. Nondiagnostic EKG on max EST due to abnormal baseline. Ex time 13 min 5 sec.  Cardiac transesophageal echocardiography (ROCAEL) 11/25/2013    EF 35%. Mod diffuse hypk. Marked LAE. Dilated LA appendage w/no thrombus. No L-R or R-L atrial septal shunt by contrast.  Marked TYSON. MV annular ring prosthesis. Mod MS. Mild MR.  Cardiomyopathy ([address]     EF 40-45%    Herniated disc     Mitral valve stenosis, moderate     S/P mitral valve repair     severe MR    S/P VSD repair     Vitamin D deficiency 12/10/2014       Current Outpatient Prescriptions   Medication Sig Dispense Refill    valACYclovir (VALTREX) 1 gram tablet Take 1 Tab by mouth three (3) times daily. 30 Tab 0    docosanol (ABREVA) 10 % topical cream Apply  to affected area five (5) times daily. 2 g 0    metoprolol succinate (TOPROL-XL) 50 mg XL tablet Take 1 Tab by mouth two (2) times a day. (Patient taking differently: Take 25 mg by mouth two (2) times a day.) 60 Tab 6    busPIRone (BUSPAR) 15 mg tablet TAKE ONE TABLET BY MOUTH TWICE DAILY 180 Tab 0    predniSONE (DELTASONE) 20 mg tablet Take 1 Tab by mouth daily (with breakfast). 7 Tab 0    meloxicam (MOBIC) 15 mg tablet Take 1 Tab by mouth daily. 30 Tab 0    warfarin (COUMADIN) 5 mg tablet Take 1 Tab by mouth daily. Or as directed 45 Tab 4    aspirin delayed-release 81 mg tablet Take  by mouth daily.  cholecalciferol (VITAMIN D3) 1,000 unit tablet Take 1 Tab by mouth daily. (Patient taking differently: Take 1,000 Units by mouth daily. 2 tabs daily) 30 Tab 3       Social History   reports that he has never smoked. He has never used smokeless tobacco.   reports that he drinks alcohol. Family History  family history includes Diabetes in his maternal grandmother.     Review of Systems  Except as stated above include:  Constitutional: Negative for fever, chills and malaise/fatigue. HEENT: No congestion or recent URI. Gastrointestinal: No nausea, vomiting, abdominal pain, bloody stools. Pulmonary:  Negative except as stated above. Cardiac:  Negative except as stated above. Musculoskeletal: Negative except as stated above. Neurological:  No localized symptoms. Skin:  Negative except as stated above. Psych:  Negative except as stated above. Endocrine:  Negative except as stated above. PHYSICAL EXAM  BP Readings from Last 3 Encounters:   07/05/18 106/60   11/27/17 101/74   10/19/17 98/60     Pulse Readings from Last 3 Encounters:   07/05/18 77   11/27/17 92   10/19/17 65     Wt Readings from Last 3 Encounters:   07/05/18 88.5 kg (195 lb)   11/27/17 89.8 kg (198 lb)   10/19/17 90.7 kg (200 lb)     General:   Well developed, well groomed. Head/Neck:   No jugular venous distention     No carotid bruits. No evidence of xanthelasma. Lungs:   No respiratory distress. Clear bilaterally. Heart:    Regular rate and rhythm. Normal S1/S2. Palpation of heart with normal point of maximum impulse. No significant murmurs, rubs or gallops. Abdomen:   Soft and nontender. No palpable abdominal mass or bruits. Extremities:   Intact peripheral pulses. No significant edema. Neurological:   Alert and oriented to person, place, time. No focal neurological deficit visually.   Skin:   No obvious rash    Blood Pressure Metric:  Zunilda Medina has been given the following recommendations today due to his elevated BP reading: controlled

## 2018-07-05 NOTE — MR AVS SNAPSHOT
[address] [place] [address] Armin Alford [address] 
[phone] Patient: Modesto Charlton MRN: [id number] :1975 Visit Information Date & Time Provider Department Dept. Phone Encounter #  
 2018  4:00 PM Mikey Plasencia MD [facility] [phone] Follow-up Instructions Return in about 1 year (around 2019). Follow-up and Disposition History Upcoming Health Maintenance Date Due Pneumococcal 19-64 Medium Risk (1 of 1 - PPSV23) 10/30/1993 DTaP/Tdap/Td series (1 - Tdap) 10/30/1995 Influenza Age 5 to Adult 2018 Allergies as of 2018  Review Complete On: 2018 By: Mikey Plasencia MD  
 No Known Allergies Current Immunizations  Reviewed on 2017 Name Date Influenza Vaccine 10/18/2017, 10/14/2016 Influenza Vaccine (Quad) PF 2015 Not reviewed this visit You Were Diagnosed With   
  
 Codes Comments S/P ablation of atrial flutter    -  Primary ICD-10-CM: Z98.890, Z86.79 
ICD-9-CM: V45.89 Typical atrial flutter (HCC)     ICD-10-CM: I48.3 ICD-9-CM: 427.32 Anxiety     ICD-10-CM: F41.9 ICD-9-CM: 300.00 S/P VSD repair     ICD-10-CM: Z98.890, Z87.74 ICD-9-CM: V45.89 S/P mitral valve repair     ICD-10-CM: V82.940 ICD-9-CM: V45.89 Vitals BP Pulse Height(growth percentile) Weight(growth percentile) SpO2 BMI  
 106/60 77 5' 10\" (1.778 m) 195 lb (88.5 kg) 99% 27.98 kg/m2 Smoking Status Never Smoker Vitals History BMI and BSA Data Body Mass Index Body Surface Area  
 27.98 kg/m 2 2.09 m 2 Preferred Pharmacy Pharmacy Name Phone [facility], [address] [place] [phone] Your Updated Medication List  
  
   
This list is accurate as of 18  4:18 PM.  Always use your most recent med list.  
  
  
  
  
 aspirin delayed-release 81 mg tablet Take  by mouth daily. busPIRone 15 mg tablet Commonly known as:  BUSPAR  
TAKE ONE TABLET BY MOUTH TWICE DAILY  
  
 cholecalciferol 1,000 unit tablet Commonly known as:  VITAMIN D3 Take 1 Tab by mouth daily. docosanol 10 % topical cream  
Commonly known as:  Petersen Jubilee Apply  to affected area five (5) times daily. meloxicam 15 mg tablet Commonly known as:  MOBIC Take 1 Tab by mouth daily. metoprolol succinate 50 mg XL tablet Commonly known as:  TOPROL-XL Take 1 Tab by mouth two (2) times a day. predniSONE 20 mg tablet Commonly known as:  Harsha Lever Take 1 Tab by mouth daily (with breakfast). valACYclovir 1 gram tablet Commonly known as:  VALTREX Take 1 Tab by mouth three (3) times daily. warfarin 5 mg tablet Commonly known as:  COUMADIN Take 1 Tab by mouth daily. Or as directed We Performed the Following AMB POC EKG ROUTINE W/ 12 LEADS, INTER & REP [45215 CPT(R)] Follow-up Instructions Return in about 1 year (around 7/5/2019). Patient Instructions Decrease metoprolol to 25mg twice daily Introducing [facility]! Dear Maira Poe: Thank you for requesting a [employer] account. Our records indicate that you already have an active [employer] account. You can access your account anytime at [email or web address] Did you know that you can access your hospital and ER discharge instructions at any time in [employer]? You can also review all of your test results from your hospital stay or ER visit. Additional Information If you have questions, please visit the Frequently Asked Questions section of the [employer] website at [email or web address] Remember, [employer] is NOT to be used for urgent needs. For medical emergencies, dial 911. Now available from your iPhone and Android! Please provide this summary of care documentation to your next provider. Your primary care clinician is listed as Anthony Sahu. If you have any questions after today's visit, please call [phone].

## 2018-07-20 RX ORDER — WARFARIN SODIUM 5 MG/1
5 TABLET ORAL DAILY
Qty: 45 TAB | Refills: 4 | Status: SHIPPED | OUTPATIENT
Start: 2018-07-20 | End: 2019-05-24 | Stop reason: SDUPTHER

## 2018-07-30 ENCOUNTER — ANTI-COAG VISIT (OUTPATIENT)
Dept: CARDIOLOGY CLINIC | Age: 43
End: 2018-07-30

## 2018-07-30 DIAGNOSIS — I48.3 TYPICAL ATRIAL FLUTTER (HCC): ICD-10-CM

## 2018-07-30 DIAGNOSIS — Z79.01 LONG TERM CURRENT USE OF ANTICOAGULANT THERAPY: Primary | ICD-10-CM

## 2018-07-30 LAB
INR BLD: 2.9
PT POC: 35.1 SECONDS
VALID INTERNAL CONTROL?: YES

## 2018-07-30 NOTE — PROGRESS NOTES
Verbal order and read back per PT INR HV CSI Patient is within therapeutic range Continue Coumadin 5 mg daily except 2.5 mg on Mon, Wed, and Fri Recheck 4 weeks This has been fully explained to the patient, who indicates understanding.

## 2018-08-29 ENCOUNTER — ANTI-COAG VISIT (OUTPATIENT)
Dept: CARDIOLOGY CLINIC | Age: 43
End: 2018-08-29

## 2018-08-29 DIAGNOSIS — Z79.01 LONG TERM CURRENT USE OF ANTICOAGULANT THERAPY: ICD-10-CM

## 2018-08-29 DIAGNOSIS — I48.92 ATRIAL FLUTTER, UNSPECIFIED TYPE (HCC): ICD-10-CM

## 2018-08-29 LAB
INR BLD: 3.5
PT POC: NORMAL SECONDS
VALID INTERNAL CONTROL?: YES

## 2018-08-29 NOTE — PROGRESS NOTES
The INR is above the therapeutic range. Ask the patient about bleeding complications. Please make the following adjustments to Coumadin dosing: 0x1 then continue Coumadin 5 mg daily except 2.5 mg on Mon, Wed, and Fri Kiran Angel Repeat the INR in 2 weeks Verbal order and read back per Akash Barnes NP Patient given instructions with read back. He verbalized understanding.

## 2018-09-04 ENCOUNTER — ANTI-COAG VISIT (OUTPATIENT)
Dept: CARDIOLOGY CLINIC | Age: 43
End: 2018-09-04

## 2018-09-04 DIAGNOSIS — I48.92 ATRIAL FLUTTER, UNSPECIFIED TYPE (HCC): Primary | ICD-10-CM

## 2018-09-04 DIAGNOSIS — Z79.01 LONG TERM CURRENT USE OF ANTICOAGULANT THERAPY: ICD-10-CM

## 2018-09-04 LAB
INR BLD: 2.2
PT POC: 26.2 SECONDS
VALID INTERNAL CONTROL?: YES

## 2018-09-04 NOTE — PATIENT INSTRUCTIONS
Verbal order and read back per Sandeep Godwin NP The INR is stable and therapeutic. Continue same dose of coumadin and recheck in 2 weeks. Decrease Coumadin to 2.5mg daily while on prednisone.  (normal dose) Coumadin 5 mg daily except 2.5 mg on Mon, Wed, and Fri

## 2018-09-04 NOTE — PROGRESS NOTES
Verbal order and read back per Alexis Corbett NP The INR is stable and therapeutic. Continue same dose of coumadin and recheck in 2 weeks. Decrease Coumadin to 2.5mg daily while on prednisone.  (normal dose) Coumadin 5 mg daily except 2.5 mg on Mon, Wed, and Fri

## 2018-09-17 ENCOUNTER — ANTI-COAG VISIT (OUTPATIENT)
Dept: CARDIOLOGY CLINIC | Age: 43
End: 2018-09-17

## 2018-09-17 DIAGNOSIS — Z79.01 LONG TERM CURRENT USE OF ANTICOAGULANT THERAPY: Primary | ICD-10-CM

## 2018-09-17 DIAGNOSIS — I48.92 ATRIAL FLUTTER, UNSPECIFIED TYPE (HCC): ICD-10-CM

## 2018-09-17 LAB
INR BLD: 2.4
PT POC: 29.3 SECONDS
VALID INTERNAL CONTROL?: YES

## 2018-09-17 NOTE — PROGRESS NOTES
Verbal order and read back per Lauren Skaggs, DO Patient is within therapeutic range Resumed (normal dose) Coumadin 5 mg daily except 2.5 mg on Mon, Wed, and Fri Recheck 4 weeks This has been fully explained to the patient, who indicates understanding.

## 2018-10-31 ENCOUNTER — ANTI-COAG VISIT (OUTPATIENT)
Dept: CARDIOLOGY CLINIC | Age: 43
End: 2018-10-31

## 2018-10-31 DIAGNOSIS — Z79.01 LONG TERM CURRENT USE OF ANTICOAGULANT THERAPY: ICD-10-CM

## 2018-10-31 DIAGNOSIS — I48.92 ATRIAL FLUTTER, UNSPECIFIED TYPE (HCC): ICD-10-CM

## 2018-10-31 LAB
INR BLD: 3.5
PT POC: 41.6 SECONDS
VALID INTERNAL CONTROL?: YES

## 2018-10-31 NOTE — PROGRESS NOTES
The INR is above the therapeutic range. Ask the patient about bleeding complications. Please make the following adjustments to Coumadin dosing: Hold today then continue Coumadin 5 mg daily except 2.5 mg on Mon, Wed, and Fri Repeat the INR in  4 weeks

## 2018-11-28 ENCOUNTER — ANTI-COAG VISIT (OUTPATIENT)
Dept: CARDIOLOGY CLINIC | Age: 43
End: 2018-11-28

## 2018-11-28 DIAGNOSIS — Z79.01 LONG TERM CURRENT USE OF ANTICOAGULANT THERAPY: ICD-10-CM

## 2018-11-28 DIAGNOSIS — I48.92 ATRIAL FLUTTER, UNSPECIFIED TYPE (HCC): ICD-10-CM

## 2018-11-28 LAB
INR BLD: 3.4
PT POC: 40.5 SECONDS
VALID INTERNAL CONTROL?: YES

## 2018-12-19 ENCOUNTER — ANTI-COAG VISIT (OUTPATIENT)
Dept: CARDIOLOGY CLINIC | Age: 43
End: 2018-12-19

## 2018-12-19 DIAGNOSIS — Z79.01 LONG TERM CURRENT USE OF ANTICOAGULANT THERAPY: ICD-10-CM

## 2018-12-19 DIAGNOSIS — I48.92 ATRIAL FLUTTER, UNSPECIFIED TYPE (HCC): ICD-10-CM

## 2018-12-19 LAB
INR BLD: 2.4
PT POC: 28.5 SECONDS
VALID INTERNAL CONTROL?: YES

## 2018-12-19 NOTE — PROGRESS NOTES
The INR is stable and therapeutic.    Continue Coumadin to 2.5 mg daily except 5 mg on Sun-Tu-Th  Recheck INR in 4 weeks

## 2019-01-15 RX ORDER — METOPROLOL SUCCINATE 25 MG/1
25 TABLET, EXTENDED RELEASE ORAL DAILY
Qty: 90 TAB | Refills: 3 | Status: SHIPPED | OUTPATIENT
Start: 2019-01-15 | End: 2019-02-21 | Stop reason: SDUPTHER

## 2019-01-15 RX ORDER — METOPROLOL SUCCINATE 50 MG/1
50 TABLET, EXTENDED RELEASE ORAL 2 TIMES DAILY
Qty: 60 TAB | Refills: 6 | Status: CANCELLED | OUTPATIENT
Start: 2019-01-15

## 2019-01-16 ENCOUNTER — ANTI-COAG VISIT (OUTPATIENT)
Dept: CARDIOLOGY CLINIC | Age: 44
End: 2019-01-16

## 2019-01-16 DIAGNOSIS — I48.92 ATRIAL FLUTTER, UNSPECIFIED TYPE (HCC): ICD-10-CM

## 2019-01-16 DIAGNOSIS — Z79.01 LONG TERM CURRENT USE OF ANTICOAGULANT THERAPY: ICD-10-CM

## 2019-01-16 LAB
INR BLD: 2.6
PT POC: 31.8 SECONDS
VALID INTERNAL CONTROL?: YES

## 2019-01-16 NOTE — PROGRESS NOTES
The INR is stable and therapeutic. Continue Coumadin to 2.5 mg daily except 5 mg on Sun-Tu-Th 
Recheck INR in 5 weeks

## 2019-02-16 NOTE — PROGRESS NOTES
The INR is above the therapeutic range. Ask the patient about bleeding complications.   Please make the following adjustments to Coumadin dosing: Hold today, 2.5mg x 1 then continue Coumadin 5 mg daily except 2.5 mg on Mon, Wed, and Fri   Repeat the INR in 2 weeks with appt Name band;

## 2019-02-21 RX ORDER — METOPROLOL SUCCINATE 25 MG/1
25 TABLET, EXTENDED RELEASE ORAL 2 TIMES DAILY
Qty: 180 TAB | Refills: 3 | Status: SHIPPED | OUTPATIENT
Start: 2019-02-21 | End: 2020-03-01

## 2019-03-06 ENCOUNTER — ANTI-COAG VISIT (OUTPATIENT)
Dept: CARDIOLOGY CLINIC | Age: 44
End: 2019-03-06

## 2019-03-06 DIAGNOSIS — Z79.01 LONG TERM CURRENT USE OF ANTICOAGULANT THERAPY: ICD-10-CM

## 2019-03-06 DIAGNOSIS — I48.92 ATRIAL FLUTTER, UNSPECIFIED TYPE (HCC): Primary | ICD-10-CM

## 2019-03-06 LAB
INR BLD: 2.1
PT POC: 25.3 SECONDS
VALID INTERNAL CONTROL?: YES

## 2019-03-06 NOTE — PATIENT INSTRUCTIONS
Verbal order and read back per Raul Botello DO  The INR is stable and therapeutic. Continue same dose of coumadin (Continue Coumadin to 2.5 mg daily except 5 mg on Sun-Tu-Th)  recheck in 1 month.

## 2019-03-06 NOTE — PROGRESS NOTES
Verbal order and read back per Paulina Martinez DO  The INR is stable and therapeutic. Continue same dose of coumadin (Continue Coumadin to 2.5 mg daily except 5 mg on Sun-Tu-Th)  recheck in 1 month.

## 2019-04-05 ENCOUNTER — ANTI-COAG VISIT (OUTPATIENT)
Dept: CARDIOLOGY CLINIC | Age: 44
End: 2019-04-05

## 2019-04-05 DIAGNOSIS — I48.92 ATRIAL FLUTTER, UNSPECIFIED TYPE (HCC): ICD-10-CM

## 2019-04-05 DIAGNOSIS — Z79.01 LONG TERM CURRENT USE OF ANTICOAGULANT THERAPY: Primary | ICD-10-CM

## 2019-04-05 LAB
INR BLD: 1.8
PT POC: 21.3 SECONDS
VALID INTERNAL CONTROL?: YES

## 2019-05-17 ENCOUNTER — ANTI-COAG VISIT (OUTPATIENT)
Dept: CARDIOLOGY CLINIC | Age: 44
End: 2019-05-17

## 2019-05-17 DIAGNOSIS — Z79.01 LONG TERM CURRENT USE OF ANTICOAGULANT THERAPY: ICD-10-CM

## 2019-05-17 DIAGNOSIS — I48.92 ATRIAL FLUTTER, UNSPECIFIED TYPE (HCC): ICD-10-CM

## 2019-05-17 LAB
INR BLD: 2
PT POC: 24.1 SECONDS
VALID INTERNAL CONTROL?: YES

## 2019-05-24 RX ORDER — WARFARIN SODIUM 5 MG/1
TABLET ORAL
Qty: 45 TAB | Refills: 6 | Status: SHIPPED | OUTPATIENT
Start: 2019-05-24 | End: 2020-06-16

## 2019-06-12 ENCOUNTER — ANTI-COAG VISIT (OUTPATIENT)
Dept: CARDIOLOGY CLINIC | Age: 44
End: 2019-06-12

## 2019-06-12 DIAGNOSIS — I48.92 ATRIAL FLUTTER, UNSPECIFIED TYPE (HCC): ICD-10-CM

## 2019-06-12 DIAGNOSIS — Z79.01 LONG TERM CURRENT USE OF ANTICOAGULANT THERAPY: ICD-10-CM

## 2019-06-12 LAB
INR BLD: 2.8
PT POC: 34.1 SECONDS
VALID INTERNAL CONTROL?: YES

## 2019-06-12 NOTE — PROGRESS NOTES
The INR is stable and therapeutic.  Continue  Coumadin to 2.5 mg daily except 5 mg on Sun-Tu-Th  Recheck INR in 5 weeks with appt

## 2019-07-18 ENCOUNTER — ANTI-COAG VISIT (OUTPATIENT)
Dept: CARDIOLOGY CLINIC | Age: 44
End: 2019-07-18

## 2019-07-18 ENCOUNTER — OFFICE VISIT (OUTPATIENT)
Dept: CARDIOLOGY CLINIC | Age: 44
End: 2019-07-18

## 2019-07-18 VITALS
WEIGHT: 202 LBS | OXYGEN SATURATION: 98 % | SYSTOLIC BLOOD PRESSURE: 100 MMHG | HEART RATE: 85 BPM | HEIGHT: 70 IN | DIASTOLIC BLOOD PRESSURE: 60 MMHG | BODY MASS INDEX: 28.92 KG/M2

## 2019-07-18 DIAGNOSIS — I48.3 TYPICAL ATRIAL FLUTTER (HCC): ICD-10-CM

## 2019-07-18 DIAGNOSIS — I34.2 NON-RHEUMATIC MITRAL VALVE STENOSIS: ICD-10-CM

## 2019-07-18 DIAGNOSIS — I48.92 ATRIAL FLUTTER, UNSPECIFIED TYPE (HCC): ICD-10-CM

## 2019-07-18 DIAGNOSIS — Z79.01 LONG TERM CURRENT USE OF ANTICOAGULANT THERAPY: ICD-10-CM

## 2019-07-18 DIAGNOSIS — Z98.890 S/P MITRAL VALVE REPAIR: Primary | ICD-10-CM

## 2019-07-18 DIAGNOSIS — Z87.74 S/P VSD REPAIR: ICD-10-CM

## 2019-07-18 DIAGNOSIS — I48.92 ATRIAL FLUTTER, UNSPECIFIED TYPE (HCC): Primary | ICD-10-CM

## 2019-07-18 LAB
INR BLD: 2.8
PT POC: 34 SECONDS
VALID INTERNAL CONTROL?: YES

## 2019-07-18 NOTE — PROGRESS NOTES
HPI: A 42-year old man here for follow up. Overall, he is doing quite well. He has a history of recurrent atrial flutter and ultimately underwent a right sided Figure 8 ablation June 2013 and redo January 2018. I cardioverted him October 2017 prior to that. He has been doing well since then. He denies any new chest pain, dyspnea, orthopnea, PND, edema. He is on chronic Coumadin without any significant bleeding issues. Impression/Plan:  1. Paroxysmal atrial flutter atypical, right sided ablation Figure 8 June 2013 by Dr. Unique Carrizales, redo January 2018. 2. Chronic Coumadin which will be continued indefinitely given his history of recurrence. 3. History of mild to moderate mitral stenosis, moderate to severe atrial flutter. 4. Remote mitral valve ring. 5. History of remote VSD repair. 6. History of transient congestive heart failure with atrial flutter, improved. 7. Nuclear stress test 2014 without ischemia. He remains in sinus rhythm with underlying left bundle branch block. He is doing well. He has not had any clinical recurrence. I will continue Coumadin indefinitely given his high risk for stroke associated with any recurrence of arrhythmias. The last time I saw him we decreased his Toprol due to concern for some memory issues. 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 At this time, there is no evidence of any unstable arrhythmias, decompensated heart failure. I am doublechecking with an echocardiogram to make sure there is no change in systolic function. It is reasonable to have his renewal without limitation from an electrophysiology standpoint. I will tentatively see him back in a year. Past Medical History:   Diagnosis Date    Anxiety     Atrial flutter with rapid ventricular response [facility]) August 2013    Status post cardioversion    Cardiac Holter exam 07/20/2015    Normal 48-hr Holter. Sinus rhythm, avg HR 78 bpm.  No ventricular ectopy.   Very rare supraventricular ectopy.  Cardiac nuclear imaging test 03/18/2014    No ischemia or prior infarction. No RWMA. EF 49%. Nondiagnostic EKG on max EST due to abnormal baseline. Ex time 13 min 5 sec.  Cardiac transesophageal echocardiography (ROCAEL) 11/25/2013    EF 35%. Mod diffuse hypk. Marked LAE. Dilated LA appendage w/no thrombus. No L-R or R-L atrial septal shunt by contrast.  Marked TYSON. MV annular ring prosthesis. Mod MS. Mild MR.  Cardiomyopathy ([address]     EF 40-45%    Herniated disc     Mitral valve stenosis, moderate     S/P mitral valve repair     severe MR    S/P VSD repair     Vitamin D deficiency 12/10/2014       Current Outpatient Medications   Medication Sig Dispense Refill    warfarin (COUMADIN) 5 mg tablet TAKE 1 TABLET BY MOUTH ONCE DAILY OR AS DIRECTED 45 Tab 6    metoprolol succinate (TOPROL-XL) 25 mg XL tablet Take 1 Tab by mouth two (2) times a day. 180 Tab 3    valACYclovir (VALTREX) 1 gram tablet Take 1 Tab by mouth three (3) times daily. 30 Tab 0    docosanol (ABREVA) 10 % topical cream Apply  to affected area five (5) times daily. 2 g 0    predniSONE (DELTASONE) 20 mg tablet Take 1 Tab by mouth daily (with breakfast). 7 Tab 0    meloxicam (MOBIC) 15 mg tablet Take 1 Tab by mouth daily. 30 Tab 0    aspirin delayed-release 81 mg tablet Take  by mouth daily.  cholecalciferol (VITAMIN D3) 1,000 unit tablet Take 1 Tab by mouth daily. (Patient taking differently: Take 1,000 Units by mouth daily. 2 tabs daily) 30 Tab 3       Social History   reports that he has never smoked. He has never used smokeless tobacco.   reports that he drinks alcohol. Family History  family history includes Diabetes in his maternal grandmother. Review of Systems  Except as stated above include:  Constitutional: Negative for fever, chills and malaise/fatigue. HEENT: No congestion or recent URI. Gastrointestinal: No nausea, vomiting, abdominal pain, bloody stools.   Pulmonary:  Negative except as stated above. Cardiac:  Negative except as stated above. Musculoskeletal: Negative except as stated above. Neurological:  No localized symptoms. Skin:  Negative except as stated above. Psych:  Negative except as stated above. Endocrine:  Negative except as stated above. PHYSICAL EXAM  BP Readings from Last 3 Encounters:   07/18/19 100/60   07/05/18 106/60   11/27/17 101/74     Pulse Readings from Last 3 Encounters:   07/18/19 85   07/05/18 77   11/27/17 92     Wt Readings from Last 3 Encounters:   07/18/19 91.6 kg (202 lb)   07/05/18 88.5 kg (195 lb)   11/27/17 89.8 kg (198 lb)     General:   Well developed, well groomed. Head/Neck:   No jugular venous distention     No carotid bruits. No evidence of xanthelasma. Lungs:   No respiratory distress. Clear bilaterally. Heart:    Regular rate and rhythm. Normal S1/S2. Palpation of heart with normal point of maximum impulse. Diastolic apical murmur. Abdomen:   Soft and nontender. No palpable abdominal mass or bruits. Extremities:   Intact peripheral pulses. No significant edema. Neurological:   Alert and oriented to person, place, time. No focal neurological deficit visually. Skin:   No obvious rash    Blood Pressure Metric:  Monitor recommended and adjustments stated if needed.

## 2019-07-18 NOTE — PROGRESS NOTES
Verbal order and read back per Carlotta Lilly NP  The INR is stable and therapeutic. Continue same dose of coumadin ( Coumadin to 2.5 mg daily except 5 mg on Sun-Tu-Th)  Recheck in 1 month.

## 2019-07-18 NOTE — PATIENT INSTRUCTIONS
Verbal order and read back per Joe Nickerson NP  The INR is stable and therapeutic. Continue same dose of coumadin ( Coumadin to 2.5 mg daily except 5 mg on Sun-Tu-Th)  Recheck in 1 month.

## 2019-07-18 NOTE — PROGRESS NOTES
Kathy Duane presents today for   Chief Complaint   Patient presents with    Hypertension     1 year follow up - no cardiac complaints        Kathy Duane preferred language for health care discussion is english/other. Is someone accompanying this pt? no    Is the patient using any DME equipment during [address]? no    Depression Screening:  3 most recent PHQ Screens 4/19/2016   Little interest or pleasure in doing things Not at all   Feeling down, depressed, irritable, or hopeless Not at all   Total Score PHQ 2 0       Learning Assessment:  Learning Assessment 7/18/2019   PRIMARY LEARNER Patient   HIGHEST LEVEL OF EDUCATION - PRIMARY LEARNER  -   BARRIERS PRIMARY LEARNER -   CO-LEARNER CAREGIVER -   PRIMARY LANGUAGE ENGLISH   LEARNER PREFERENCE PRIMARY DEMONSTRATION   ANSWERED BY Patient   RELATIONSHIP SELF       Abuse Screening:  Abuse Screening Questionnaire 9/8/2014   Do you ever feel afraid of your partner? N   Are you in a relationship with someone who physically or mentally threatens you? N   Is it safe for you to go home? Y       Fall Risk  No flowsheet data found. Pt currently taking Anticoagulant therapy? Warfarin     Coordination of Care:  1. Have you been to the ER, urgent care clinic since your last visit? Hospitalized since your last visit? no    2. Have you seen or consulted any other health care providers outside of the [address] [place] [address] since your last visit? Include any pap smears or colon screening.  no

## 2019-08-22 ENCOUNTER — TELEPHONE (OUTPATIENT)
Dept: CARDIOLOGY CLINIC | Age: 44
End: 2019-08-22

## 2020-03-01 RX ORDER — METOPROLOL SUCCINATE 25 MG/1
TABLET, EXTENDED RELEASE ORAL
Qty: 180 TAB | Refills: 0 | Status: SHIPPED | OUTPATIENT
Start: 2020-03-01 | End: 2020-05-29

## 2020-03-13 ENCOUNTER — ANTI-COAG VISIT (OUTPATIENT)
Dept: CARDIOLOGY CLINIC | Age: 45
End: 2020-03-13

## 2020-03-13 DIAGNOSIS — Z79.01 LONG TERM CURRENT USE OF ANTICOAGULANT THERAPY: Primary | ICD-10-CM

## 2020-03-13 DIAGNOSIS — I48.92 ATRIAL FLUTTER, UNSPECIFIED TYPE (HCC): ICD-10-CM

## 2020-03-13 LAB
INR BLD: 2.1
PT POC: 24.8 SECONDS
VALID INTERNAL CONTROL?: YES

## 2020-04-06 ENCOUNTER — DOCUMENTATION ONLY (OUTPATIENT)
Dept: CARDIOLOGY CLINIC | Age: 45
End: 2020-04-06

## 2020-05-29 RX ORDER — METOPROLOL SUCCINATE 25 MG/1
TABLET, EXTENDED RELEASE ORAL
Qty: 180 TAB | Refills: 0 | Status: SHIPPED | OUTPATIENT
Start: 2020-05-29 | End: 2020-08-25

## 2020-06-16 RX ORDER — WARFARIN SODIUM 5 MG/1
TABLET ORAL
Qty: 45 TAB | Refills: 5 | Status: SHIPPED | OUTPATIENT
Start: 2020-06-16 | End: 2021-07-13 | Stop reason: SDUPTHER

## 2020-07-16 ENCOUNTER — ANTI-COAG VISIT (OUTPATIENT)
Dept: CARDIOLOGY CLINIC | Age: 45
End: 2020-07-16

## 2020-07-16 ENCOUNTER — OFFICE VISIT (OUTPATIENT)
Dept: CARDIOLOGY CLINIC | Age: 45
End: 2020-07-16

## 2020-07-16 VITALS
OXYGEN SATURATION: 96 % | HEIGHT: 70 IN | SYSTOLIC BLOOD PRESSURE: 136 MMHG | BODY MASS INDEX: 28.63 KG/M2 | HEART RATE: 82 BPM | WEIGHT: 200 LBS | DIASTOLIC BLOOD PRESSURE: 66 MMHG

## 2020-07-16 DIAGNOSIS — Z79.01 LONG TERM CURRENT USE OF ANTICOAGULANT THERAPY: Primary | ICD-10-CM

## 2020-07-16 DIAGNOSIS — Z98.890 S/P MITRAL VALVE REPAIR: ICD-10-CM

## 2020-07-16 DIAGNOSIS — Z79.01 LONG TERM CURRENT USE OF ANTICOAGULANT THERAPY: ICD-10-CM

## 2020-07-16 DIAGNOSIS — Z87.74 S/P VSD REPAIR: ICD-10-CM

## 2020-07-16 DIAGNOSIS — I48.92 ATRIAL FLUTTER, UNSPECIFIED TYPE (HCC): Primary | ICD-10-CM

## 2020-07-16 DIAGNOSIS — I48.92 ATRIAL FLUTTER, UNSPECIFIED TYPE (HCC): ICD-10-CM

## 2020-07-16 DIAGNOSIS — I34.2 NON-RHEUMATIC MITRAL VALVE STENOSIS: ICD-10-CM

## 2020-07-16 LAB
INR BLD: 2.8
PT POC: 33 SECONDS
VALID INTERNAL CONTROL?: YES

## 2020-07-16 NOTE — PROGRESS NOTES
Karol Barajas presents today for   Chief Complaint   Patient presents with    Follow-up     1 year follow up       Karol Barajas preferred language for health care discussion is english/other. Is someone accompanying this pt? no    Is the patient using any DME equipment during [address]? no    Depression Screening:  3 most recent PHQ Screens 7/16/2020   Little interest or pleasure in doing things Not at all   Feeling down, depressed, irritable, or hopeless Not at all   Total Score PHQ 2 0       Learning Assessment:  Learning Assessment 7/18/2019   PRIMARY LEARNER Patient   HIGHEST LEVEL OF EDUCATION - PRIMARY LEARNER  -   BARRIERS PRIMARY LEARNER -   CO-LEARNER CAREGIVER -   PRIMARY LANGUAGE ENGLISH   LEARNER PREFERENCE PRIMARY DEMONSTRATION   ANSWERED BY Patient   RELATIONSHIP SELF       Abuse Screening:  Abuse Screening Questionnaire 7/16/2020   Do you ever feel afraid of your partner? N   Are you in a relationship with someone who physically or mentally threatens you? N   Is it safe for you to go home? Y       Fall Risk  Fall Risk Assessment, last 12 mths 7/16/2020   Able to walk? Yes   Fall in past 12 months? No       Pt currently taking Anticoagulant therapy? 2.5 mg daily except 5 mg every Sunday, Tuesday, and Thursday and ASA 81 mg once a day          Coordination of Care:  1. Have you been to the ER, urgent care clinic since your last visit? Hospitalized since your last visit? 05-05-20 for Hematoma    2. Have you seen or consulted any other health care providers outside of the [address] [place] [address] since your last visit? Include any pap smears or colon screening.  no

## 2020-07-16 NOTE — PATIENT INSTRUCTIONS
If you have not heard from the central scheduler to schedule your testing in 48 hours, please call [phone].
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
## 2020-07-16 NOTE — PATIENT INSTRUCTIONS
The INR is stable and therapeutic. Continue Coumadin to 2.5 mg daily except 5 mg on Sun-Tu-Th  Recheck in 1 month. This has been fully explained to the patient, who indicates understanding.

## 2020-08-14 ENCOUNTER — ANTI-COAG VISIT (OUTPATIENT)
Dept: CARDIOLOGY CLINIC | Age: 45
End: 2020-08-14

## 2020-08-14 DIAGNOSIS — Z79.01 LONG TERM CURRENT USE OF ANTICOAGULANT THERAPY: Primary | ICD-10-CM

## 2020-08-14 LAB
INR BLD: 3.1
PT POC: 37.5 SECONDS
VALID INTERNAL CONTROL?: YES

## 2020-08-14 NOTE — PROGRESS NOTES
Verbal order and read back per GREGORIO Valentino NP   Coumadin to 2.5 mg daily except 5 mg on Sun-Tu-Th. Eat greens. Recheck in 1 month. Patient made aware of dosing and recheck instructions, no questions.

## 2020-08-25 RX ORDER — METOPROLOL SUCCINATE 25 MG/1
TABLET, EXTENDED RELEASE ORAL
Qty: 180 TAB | Refills: 3 | Status: SHIPPED | OUTPATIENT
Start: 2020-08-25 | End: 2021-09-04

## 2020-09-11 ENCOUNTER — ANTI-COAG VISIT (OUTPATIENT)
Dept: CARDIOLOGY CLINIC | Age: 45
End: 2020-09-11

## 2020-09-11 DIAGNOSIS — I48.92 ATRIAL FLUTTER, UNSPECIFIED TYPE (HCC): ICD-10-CM

## 2020-09-11 DIAGNOSIS — Z79.01 LONG TERM CURRENT USE OF ANTICOAGULANT THERAPY: ICD-10-CM

## 2020-09-11 LAB
INR BLD: 2.6
PT POC: 30.8 SECONDS
VALID INTERNAL CONTROL?: YES

## 2020-09-11 NOTE — PROGRESS NOTES
The INR is stable and therapeutic. Continue Coumadin to 2.5 mg daily except 5 mg on Sun-Tu-Th. Recheck INR in 1 month.

## 2020-10-09 ENCOUNTER — ANTI-COAG VISIT (OUTPATIENT)
Dept: CARDIOLOGY CLINIC | Age: 45
End: 2020-10-09
Payer: COMMERCIAL

## 2020-10-09 DIAGNOSIS — Z79.01 LONG TERM CURRENT USE OF ANTICOAGULANT THERAPY: ICD-10-CM

## 2020-10-09 DIAGNOSIS — I48.92 ATRIAL FLUTTER, UNSPECIFIED TYPE (HCC): ICD-10-CM

## 2020-10-09 LAB
INR BLD: 2.8
PT POC: 33.9 SECONDS
VALID INTERNAL CONTROL?: YES

## 2020-10-09 PROCEDURE — 85610 PROTHROMBIN TIME: CPT | Performed by: NURSE PRACTITIONER

## 2021-01-15 ENCOUNTER — ANTI-COAG VISIT (OUTPATIENT)
Dept: CARDIOLOGY CLINIC | Age: 46
End: 2021-01-15
Payer: COMMERCIAL

## 2021-01-15 DIAGNOSIS — Z79.01 LONG TERM CURRENT USE OF ANTICOAGULANT THERAPY: ICD-10-CM

## 2021-01-15 DIAGNOSIS — I48.92 ATRIAL FLUTTER, UNSPECIFIED TYPE (HCC): ICD-10-CM

## 2021-01-15 LAB
INR BLD: 2.1
PT POC: 25.1 SECONDS
VALID INTERNAL CONTROL?: YES

## 2021-01-15 PROCEDURE — 85610 PROTHROMBIN TIME: CPT | Performed by: NURSE PRACTITIONER

## 2021-01-15 NOTE — PROGRESS NOTES
The INR is stable and therapeutic. Continue coumadin to 2.5 mg daily except 5 mg on Sun-Tu-Th.    Recheck INR in 5 weeks

## 2021-01-25 ENCOUNTER — OFFICE VISIT (OUTPATIENT)
Dept: ORTHOPEDIC SURGERY | Age: 46
End: 2021-01-25
Payer: COMMERCIAL

## 2021-01-25 VITALS
HEART RATE: 85 BPM | HEIGHT: 70 IN | RESPIRATION RATE: 15 BRPM | DIASTOLIC BLOOD PRESSURE: 74 MMHG | BODY MASS INDEX: 29.63 KG/M2 | SYSTOLIC BLOOD PRESSURE: 122 MMHG | WEIGHT: 207 LBS | TEMPERATURE: 96 F

## 2021-01-25 DIAGNOSIS — M77.11 LATERAL EPICONDYLITIS OF RIGHT ELBOW: Primary | ICD-10-CM

## 2021-01-25 PROCEDURE — 99204 OFFICE O/P NEW MOD 45 MIN: CPT | Performed by: FAMILY MEDICINE

## 2021-01-25 RX ORDER — DICLOFENAC SODIUM 10 MG/G
2 GEL TOPICAL
Qty: 100 G | Refills: 1 | Status: SHIPPED | OUTPATIENT
Start: 2021-01-25 | End: 2021-09-29

## 2021-01-25 NOTE — PROGRESS NOTES
AVS reviewed: YES  HEP: AT demonstrated  Resources Provided: YES YouTube Videos and printed photo of wrist brace  Patient questions/concerns answered: NO. Pt denied questions/concerns  Patient verbalized understanding of treatment plan: YES

## 2021-01-25 NOTE — PROGRESS NOTES
HISTORY OF PRESENT ILLNESS    Dorcas Reyes 1975 is a 54y.o. year old male comes in today as new patient for: right elbow pain    Patients symptoms have been present for several months. Pain level 9/10 lateral. It has worsened with lifting palm down. Patient has tried:  Brace/sleeve and icy hot with minimal benefit. Rest does help. It is described as pain in elbow w/o known cause. Past Surgical History:   Procedure Laterality Date    HX AFIB ABLATION  2014    Flutter/fib    Vonda Ceballos     Social History     Socioeconomic History    Marital status:      Spouse name: Not on file    Number of children: Not on file    Years of education: Not on file    Highest education level: Not on file   Tobacco Use    Smoking status: Never Smoker    Smokeless tobacco: Never Used   Substance and Sexual Activity    Alcohol use: Yes     Alcohol/week: 0.0 standard drinks      Current Outpatient Medications   Medication Sig Dispense Refill    metoprolol succinate (TOPROL-XL) 25 mg XL tablet Take 1 tablet by mouth twice daily 180 Tab 3    warfarin (COUMADIN) 5 mg tablet TAKE 1 TABLET BY MOUTH ONCE DAILY OR  AS  DIRECTED 45 Tab 5    aspirin delayed-release 81 mg tablet Take  by mouth daily.  cholecalciferol (VITAMIN D3) 1,000 unit tablet Take 1 Tab by mouth daily. (Patient taking differently: Take 1,000 Units by mouth daily. 2 tabs daily) 30 Tab 3     Past Medical History:   Diagnosis Date    Anxiety     Atrial flutter with rapid ventricular response [facility]) August 2013    Status post cardioversion    Cardiac Holter exam 07/20/2015    Normal 48-hr Holter. Sinus rhythm, avg HR 78 bpm.  No ventricular ectopy. Very rare supraventricular ectopy.  Cardiac nuclear imaging test 03/18/2014    No ischemia or prior infarction. No RWMA. EF 49%. Nondiagnostic EKG on max EST due to abnormal baseline. Ex time 13 min 5 sec.     Cardiac transesophageal echocardiography (ROCAEL) 11/25/2013    EF 35%. Mod diffuse hypk. Marked LAE. Dilated LA appendage w/no thrombus. No L-R or R-L atrial septal shunt by contrast.  Marked TYSON. MV annular ring prosthesis. Mod MS. Mild MR.  Cardiomyopathy ([address]     EF 40-45%    Herniated disc     Mitral valve stenosis, moderate     S/P mitral valve repair     severe MR    S/P VSD repair     Vitamin D deficiency 12/10/2014     Family History   Problem Relation Age of Onset    Diabetes Maternal Grandmother          ROS:  No swell, bruise, numb      Objective:  /74   Pulse 85   Temp (!) 96 °F (35.6 °C)   Resp 15   Ht 5' 10\" (1.778 m)   Wt 207 lb (93.9 kg)   BMI 29.70 kg/m²   NEURO:  Sensation intact light touch upper and lower extremities. Biceps & Triceps reflexes +2/4 bilaterally. right hand dominant. M/S:  right elbow/wrist: Negative shayy tenderness. Phalen's negative. Tinel's negative. Strength +5/5 bilateral .  Piano key sign Negative bilateral .  Carpal bone motion normal.  Finklestein's negative  TFCC Load Test negative. RIGHT lateral epicondyle(s) with TTP worsened with wrist extension. negative muscular atrophy. Assessment/Plan:     ICD-10-CM ICD-9-CM    1. Lateral epicondylitis of right elbow  M77.11 726.32 diclofenac (VOLTAREN) 1 % gel       Patient (or guardian if minor) verbalizes understanding of evaluation and plan. Will start OTC brace and voltaren gel w/ stretch and RTC 6 weeks.

## 2021-02-19 ENCOUNTER — ANTI-COAG VISIT (OUTPATIENT)
Dept: CARDIOLOGY CLINIC | Age: 46
End: 2021-02-19
Payer: COMMERCIAL

## 2021-02-19 DIAGNOSIS — Z79.01 LONG TERM CURRENT USE OF ANTICOAGULANT THERAPY: ICD-10-CM

## 2021-02-19 DIAGNOSIS — I48.92 ATRIAL FLUTTER, UNSPECIFIED TYPE (HCC): ICD-10-CM

## 2021-02-19 LAB
INR BLD: 2.6
PT POC: 30.8 SECONDS
VALID INTERNAL CONTROL?: YES

## 2021-02-19 PROCEDURE — 85610 PROTHROMBIN TIME: CPT | Performed by: NURSE PRACTITIONER

## 2021-03-08 ENCOUNTER — OFFICE VISIT (OUTPATIENT)
Dept: ORTHOPEDIC SURGERY | Age: 46
End: 2021-03-08
Payer: COMMERCIAL

## 2021-03-08 VITALS
HEIGHT: 70 IN | SYSTOLIC BLOOD PRESSURE: 116 MMHG | HEART RATE: 83 BPM | BODY MASS INDEX: 29.35 KG/M2 | RESPIRATION RATE: 15 BRPM | DIASTOLIC BLOOD PRESSURE: 75 MMHG | WEIGHT: 205 LBS | TEMPERATURE: 98.7 F

## 2021-03-08 DIAGNOSIS — M77.11 LATERAL EPICONDYLITIS OF RIGHT ELBOW: Primary | ICD-10-CM

## 2021-03-08 PROCEDURE — 99213 OFFICE O/P EST LOW 20 MIN: CPT | Performed by: FAMILY MEDICINE

## 2021-03-08 NOTE — PROGRESS NOTES
HISTORY OF PRESENT ILLNESS    True Moy 1975 is a 54y.o. year old male comes in today to be evaluated and treated for: right elbow paoin    Since last appt has noticed improvement brace wrist. Pain level 2/10. Using voltaren gel with benefit. Will flare with too much activity but normal day to day no issues. Past Surgical History:   Procedure Laterality Date    HX AFIB ABLATION  2014    Flutter/fib    Lyndon [facility] [place]     Social History     Socioeconomic History    Marital status:      Spouse name: Not on file    Number of children: Not on file    Years of education: Not on file    Highest education level: Not on file   Tobacco Use    Smoking status: Never Smoker    Smokeless tobacco: Never Used   Substance and Sexual Activity    Alcohol use: Yes     Alcohol/week: 0.0 standard drinks     Current Outpatient Medications   Medication Sig Dispense Refill    diclofenac (VOLTAREN) 1 % gel Apply 2 g to affected area every six (6) hours as needed for Pain (right elbow). 100 g 1    metoprolol succinate (TOPROL-XL) 25 mg XL tablet Take 1 tablet by mouth twice daily 180 Tab 3    warfarin (COUMADIN) 5 mg tablet TAKE 1 TABLET BY MOUTH ONCE DAILY OR  AS  DIRECTED 45 Tab 5    aspirin delayed-release 81 mg tablet Take  by mouth daily.  cholecalciferol (VITAMIN D3) 1,000 unit tablet Take 1 Tab by mouth daily. (Patient taking differently: Take 1,000 Units by mouth daily. 2 tabs daily) 30 Tab 3     Past Medical History:   Diagnosis Date    Anxiety     Atrial flutter with rapid ventricular response [facility]) August 2013    Status post cardioversion    Cardiac Holter exam 07/20/2015    Normal 48-hr Holter. Sinus rhythm, avg HR 78 bpm.  No ventricular ectopy. Very rare supraventricular ectopy.  Cardiac nuclear imaging test 03/18/2014    No ischemia or prior infarction. No RWMA. EF 49%. Nondiagnostic EKG on max EST due to abnormal baseline. Ex time 13 min 5 sec.  Cardiac transesophageal echocardiography (ROCAEL) 11/25/2013    EF 35%. Mod diffuse hypk. Marked LAE. Dilated LA appendage w/no thrombus. No L-R or R-L atrial septal shunt by contrast.  Marked TYSON. MV annular ring prosthesis. Mod MS. Mild MR.  Cardiomyopathy ([address]     EF 40-45%    Herniated disc     Mitral valve stenosis, moderate     S/P mitral valve repair     severe MR    S/P VSD repair     Vitamin D deficiency 12/10/2014     Family History   Problem Relation Age of Onset    Diabetes Maternal Grandmother          ROS:  No swell, bruise, numb    Objective:  /75   Pulse 83   Temp 98.7 °F (37.1 °C)   Resp 15   Ht 5' 10\" (1.778 m)   Wt 205 lb (93 kg)   BMI 29.41 kg/m²   NEURO:  Sensation intact light touch upper and lower extremities. Biceps & Triceps reflexes +2/4 bilaterally. right hand dominant. M/S:  right elbow/wrist: Negative shayy tenderness. Phalen's negative. Tinel's negative. Strength +5/5 bilateral .  Piano key sign Negative bilateral .  Carpal bone motion normal.  Finklestein's negative  TFCC Load Test negative. RIGHT lateral epicondyle(s) with TTP worsened with wrist extension. negative muscular atrophy. Assessment/Plan:     ICD-10-CM ICD-9-CM    1. Lateral epicondylitis of right elbow  M77.11 726.32        Patient (or guardian if minor) verbalizes understanding of evaluation and plan. Will continue current and RTC as needed.

## 2021-03-08 NOTE — PROGRESS NOTES
AVS reviewed: YES  HEP: N/A  Resources Provided: YES AVS  Patient questions/concerns answered: NO Pt had no question or concerns  Patient verbalized understanding of treatment plan: YES

## 2021-03-30 ENCOUNTER — ANTI-COAG VISIT (OUTPATIENT)
Dept: CARDIOLOGY CLINIC | Age: 46
End: 2021-03-30
Payer: COMMERCIAL

## 2021-03-30 DIAGNOSIS — I48.92 ATRIAL FLUTTER, UNSPECIFIED TYPE (HCC): ICD-10-CM

## 2021-03-30 DIAGNOSIS — Z79.01 LONG TERM CURRENT USE OF ANTICOAGULANT THERAPY: ICD-10-CM

## 2021-03-30 LAB
INR BLD: 2.2
PT POC: 25.8 SECONDS
VALID INTERNAL CONTROL?: YES

## 2021-03-30 PROCEDURE — 85610 PROTHROMBIN TIME: CPT | Performed by: NURSE PRACTITIONER

## 2021-03-30 NOTE — PROGRESS NOTES
The INR is stable and therapeutic. Continue same dose of coumadin: Continue coumadin to 2.5 mg daily except 5 mg on Sun-Tu-Th. Recheck in 4 weeks.

## 2021-04-30 ENCOUNTER — ANTI-COAG VISIT (OUTPATIENT)
Dept: CARDIOLOGY CLINIC | Age: 46
End: 2021-04-30
Payer: COMMERCIAL

## 2021-04-30 DIAGNOSIS — Z79.01 LONG TERM CURRENT USE OF ANTICOAGULANT THERAPY: ICD-10-CM

## 2021-04-30 DIAGNOSIS — I48.92 ATRIAL FLUTTER, UNSPECIFIED TYPE (HCC): ICD-10-CM

## 2021-04-30 LAB
INR BLD: 2.2
PT POC: 26.2 SECONDS
VALID INTERNAL CONTROL?: YES

## 2021-04-30 PROCEDURE — 85610 PROTHROMBIN TIME: CPT | Performed by: NURSE PRACTITIONER

## 2021-06-04 ENCOUNTER — ANTI-COAG VISIT (OUTPATIENT)
Dept: CARDIOLOGY CLINIC | Age: 46
End: 2021-06-04
Payer: COMMERCIAL

## 2021-06-04 DIAGNOSIS — Z79.01 LONG TERM CURRENT USE OF ANTICOAGULANT THERAPY: ICD-10-CM

## 2021-06-04 DIAGNOSIS — I48.3 TYPICAL ATRIAL FLUTTER (HCC): Primary | ICD-10-CM

## 2021-06-04 LAB
INR BLD: 3.3
PT POC: 39.2 SECONDS
VALID INTERNAL CONTROL?: YES

## 2021-06-04 PROCEDURE — 85610 PROTHROMBIN TIME: CPT | Performed by: NURSE PRACTITIONER

## 2021-06-04 NOTE — PROGRESS NOTES
The INR is above the therapeutic range. Ask the patient about bleeding complications. Please make the following adjustments to Coumadin dosing:  Hold x 1 then Continue coumadin to 2.5 mg daily except 5 mg on Sun-Tu-Th.    Repeat the INR in 4 weeks

## 2021-07-02 ENCOUNTER — ANTI-COAG VISIT (OUTPATIENT)
Dept: CARDIOLOGY CLINIC | Age: 46
End: 2021-07-02
Payer: COMMERCIAL

## 2021-07-02 DIAGNOSIS — Z79.01 LONG TERM CURRENT USE OF ANTICOAGULANT THERAPY: ICD-10-CM

## 2021-07-02 DIAGNOSIS — I48.92 ATRIAL FLUTTER, UNSPECIFIED TYPE (HCC): Primary | ICD-10-CM

## 2021-07-02 LAB
INR BLD: 2.9
PT POC: 34.2 SECONDS
VALID INTERNAL CONTROL?: YES

## 2021-07-02 PROCEDURE — 85610 PROTHROMBIN TIME: CPT | Performed by: NURSE PRACTITIONER

## 2021-07-13 RX ORDER — WARFARIN SODIUM 5 MG/1
TABLET ORAL
Qty: 45 TABLET | Refills: 5 | Status: SHIPPED | OUTPATIENT
Start: 2021-07-13 | End: 2022-07-12

## 2021-08-06 DIAGNOSIS — Z98.890 S/P MITRAL VALVE REPAIR: Primary | ICD-10-CM

## 2021-08-13 ENCOUNTER — ANTI-COAG VISIT (OUTPATIENT)
Dept: CARDIOLOGY CLINIC | Age: 46
End: 2021-08-13
Payer: COMMERCIAL

## 2021-08-13 DIAGNOSIS — I48.92 ATRIAL FLUTTER, UNSPECIFIED TYPE (HCC): Primary | ICD-10-CM

## 2021-08-13 DIAGNOSIS — Z79.01 LONG TERM CURRENT USE OF ANTICOAGULANT THERAPY: ICD-10-CM

## 2021-08-13 LAB
INR BLD: 3.8
PT POC: 45.9 SECONDS
VALID INTERNAL CONTROL?: YES

## 2021-08-13 PROCEDURE — 85610 PROTHROMBIN TIME: CPT | Performed by: NURSE PRACTITIONER

## 2021-08-13 NOTE — PROGRESS NOTES
The INR is above the therapeutic range. Ask the patient about bleeding complications. Please make the following adjustments to Coumadin dosing: Hold today then continue coumadin to 2.5 mg daily except 5 mg on Sun-Tu-Th.    Repeat the INR in 3 weeks with appt

## 2021-09-04 RX ORDER — METOPROLOL SUCCINATE 25 MG/1
TABLET, EXTENDED RELEASE ORAL
Qty: 180 TABLET | Refills: 0 | Status: SHIPPED | OUTPATIENT
Start: 2021-09-04 | End: 2021-09-29

## 2021-09-29 ENCOUNTER — ANTI-COAG VISIT (OUTPATIENT)
Dept: CARDIOLOGY CLINIC | Age: 46
End: 2021-09-29

## 2021-09-29 ENCOUNTER — OFFICE VISIT (OUTPATIENT)
Dept: CARDIOLOGY CLINIC | Age: 46
End: 2021-09-29

## 2021-09-29 VITALS
WEIGHT: 206 LBS | DIASTOLIC BLOOD PRESSURE: 70 MMHG | SYSTOLIC BLOOD PRESSURE: 120 MMHG | HEIGHT: 70 IN | HEART RATE: 79 BPM | OXYGEN SATURATION: 98 % | BODY MASS INDEX: 29.49 KG/M2

## 2021-09-29 DIAGNOSIS — Z87.74 S/P VSD REPAIR: ICD-10-CM

## 2021-09-29 DIAGNOSIS — I05.0 MITRAL VALVE STENOSIS, UNSPECIFIED ETIOLOGY: ICD-10-CM

## 2021-09-29 DIAGNOSIS — Z86.79 S/P ABLATION OF ATRIAL FLUTTER: Primary | ICD-10-CM

## 2021-09-29 DIAGNOSIS — Z79.01 LONG TERM CURRENT USE OF ANTICOAGULANT THERAPY: ICD-10-CM

## 2021-09-29 DIAGNOSIS — I48.3 TYPICAL ATRIAL FLUTTER (HCC): Primary | ICD-10-CM

## 2021-09-29 DIAGNOSIS — Z98.890 S/P ABLATION OF ATRIAL FLUTTER: Primary | ICD-10-CM

## 2021-09-29 DIAGNOSIS — I34.2 NON-RHEUMATIC MITRAL VALVE STENOSIS: ICD-10-CM

## 2021-09-29 DIAGNOSIS — Z98.890 S/P MITRAL VALVE REPAIR: ICD-10-CM

## 2021-09-29 LAB
INR BLD: 2.2
PT POC: 26.5 SECONDS
VALID INTERNAL CONTROL?: YES

## 2021-09-29 PROCEDURE — 85610 PROTHROMBIN TIME: CPT | Performed by: INTERNAL MEDICINE

## 2021-09-29 PROCEDURE — 93000 ELECTROCARDIOGRAM COMPLETE: CPT | Performed by: INTERNAL MEDICINE

## 2021-09-29 PROCEDURE — 99215 OFFICE O/P EST HI 40 MIN: CPT | Performed by: INTERNAL MEDICINE

## 2021-09-29 RX ORDER — METOPROLOL TARTRATE 25 MG/1
25 TABLET, FILM COATED ORAL 2 TIMES DAILY
COMMUNITY

## 2021-09-29 NOTE — PROGRESS NOTES
History of Present Illness: 54year old male here for follow up. Overall he is doing relatively well. He would like to start exercising. He continues to work in the [employer], thinking of becoming a captain. No new chest pain, dyspnea, PND, orthopnea, edema. No bleeding issues on Coumadin. Impression:  1. History of paroxysmal atypical atrial flutter with right sided figure of 8 ablation June, 2013 by Dr. Fiorella Tinajero with redo January, 2018. 2. Chronic Coumadin, which will be continued indefinitely due to his history of recurrent atrial flutter. 3. Moderate mitral stenosis with mean gradient 9 mmHg 09/29/21 with normal EF. It was 7 mmHg in 2017. 4. History of VSD repair and mitral valve ring in 1992. 5. Previous transient CHF with improvement after atrial flutter ablation. 6. Chronic RBBB. 7. Nuclear stress test 2014 without ischemia. Echocardiogram September, 2021 with normal EF. 8. Blood pressure stable, no evidence of decompensated heart failure. Plan:  EKG is unchanged with RBBB. No clinical recurrence of atrial arrhythmias, although his mean gradient is slightly increased at 9 mmHg with a pulse of approximately 80 bpm compared to a few years ago when it was 7 mmHg. I would like to see him back in a year and repeat an echocardiogram.      Past Medical History:   Diagnosis Date    Anxiety     Atrial flutter with rapid ventricular response [facility]) August 2013    Status post cardioversion    Cardiac Holter exam 07/20/2015    Normal 48-hr Holter. Sinus rhythm, avg HR 78 bpm.  No ventricular ectopy. Very rare supraventricular ectopy.  Cardiac nuclear imaging test 03/18/2014    No ischemia or prior infarction. No RWMA. EF 49%. Nondiagnostic EKG on max EST due to abnormal baseline. Ex time 13 min 5 sec.  Cardiac transesophageal echocardiography (ROCAEL) 11/25/2013    EF 35%. Mod diffuse hypk. Marked LAE. Dilated LA appendage w/no thrombus.   No L-R or R-L atrial septal shunt by contrast.  Marked TYSON. MV annular ring prosthesis. Mod MS. Mild MR.  Cardiomyopathy ([address]     EF 40-45%    Herniated disc     Mitral valve stenosis, moderate     S/P mitral valve repair     severe MR    S/P VSD repair     Vitamin D deficiency 12/10/2014       Current Outpatient Medications   Medication Sig Dispense Refill    metoprolol tartrate (LOPRESSOR) 25 mg tablet Take 25 mg by mouth two (2) times a day.  warfarin (COUMADIN) 5 mg tablet TAKE 1 TABLET BY MOUTH ONCE DAILY OR  AS  DIRECTED 45 Tablet 5    aspirin delayed-release 81 mg tablet Take  by mouth daily.  cholecalciferol (VITAMIN D3) 1,000 unit tablet Take 1 Tab by mouth daily. (Patient taking differently: Take 1,000 Units by mouth daily. 2 tabs daily) 30 Tab 3       Social History   reports that he has never smoked. He has never used smokeless tobacco.   reports current alcohol use. Family History  family history includes Diabetes in his maternal grandmother. Review of Systems  Except as stated above include:  Constitutional: Negative for fever, chills and malaise/fatigue. HEENT: No congestion or recent URI. Gastrointestinal: No nausea, vomiting, abdominal pain, bloody stools. Pulmonary:  Negative except as stated above. Cardiac:  Negative except as stated above. Musculoskeletal: Negative except as stated above. Neurological:  No localized symptoms. Skin:  Negative except as stated above. Psych:  Negative except as stated above. Endocrine:  Negative except as stated above. PHYSICAL EXAM  BP Readings from Last 3 Encounters:   09/29/21 120/70   09/29/21 116/75   03/08/21 116/75     Pulse Readings from Last 3 Encounters:   09/29/21 79   03/08/21 83   01/25/21 85     Wt Readings from Last 3 Encounters:   09/29/21 93.4 kg (206 lb)   09/29/21 93 kg (205 lb)   03/08/21 93 kg (205 lb)     General:   Well developed, well groomed. Head/Neck:   No obvious jugular venous distention     No obvious carotid pulsations. No evidence of xanthelasma. Lungs:   No respiratory distress. Clear bilaterally. Heart:  Regular rate and rhythm. Normal S1/S2. Palpation grossly normal.    Apical diastolic murmu. Abdomen:   Non-acute abdomen. No obvious pulsations. Extremities:   Intact peripheral pulses. No significant edema. Neurological:   Alert and oriented to person, place, time. No focal neurological deficit visually. Skin:   No obvious rash    Blood Pressure Metric:  Monitor recommended and adjustments stated if needed.

## 2021-09-29 NOTE — PROGRESS NOTES
Verbal order and read back per Kenisha Bernard MD    The INR is stable and therapeutic. Continue coumadin to 2.5 mg daily except 5 mg on Sun-Tu-Th. Recheck INR in office in 4 weeks. This has been fully explained to the patient, who indicates understanding.

## 2021-09-29 NOTE — PROGRESS NOTES
Philip Cabrera presents today for   Chief Complaint   Patient presents with    Follow-up     overdue follow up       Philip Cabrera preferred language for health care discussion is english/other. Is someone accompanying this pt? no    Is the patient using any DME equipment during [address]? no    Depression Screening:  3 most recent PHQ Screens 9/29/2021   Little interest or pleasure in doing things Not at all   Feeling down, depressed, irritable, or hopeless Not at all   Total Score PHQ 2 0       Learning Assessment:  Learning Assessment 9/29/2021   PRIMARY LEARNER Patient   HIGHEST LEVEL OF EDUCATION - PRIMARY LEARNER  -   BARRIERS PRIMARY LEARNER -   CO-LEARNER CAREGIVER -   PRIMARY LANGUAGE ENGLISH   LEARNER PREFERENCE PRIMARY DEMONSTRATION   ANSWERED BY patient   RELATIONSHIP SELF       Abuse Screening:  Abuse Screening Questionnaire 9/29/2021   Do you ever feel afraid of your partner? N   Are you in a relationship with someone who physically or mentally threatens you? N   Is it safe for you to go home? Y       Fall Risk  Fall Risk Assessment, last 12 mths 7/16/2020   Able to walk? Yes   Fall in past 12 months? No           Pt currently taking Anticoagulant therapy? Warfarin 2.5 mg daily except 5 mg on Rnltea-Dakrsfw-[id number]    Pt currently taking Antiplatelet therapy ? ASA 81 mg once a day      Coordination of Care:  1. Have you been to the ER, urgent care clinic since your last visit? Hospitalized since your last visit? no    2. Have you seen or consulted any other health care providers outside of the [address] [place] [address] since your last visit? Include any pap smears or colon screening.  no

## 2021-10-29 ENCOUNTER — ANTI-COAG VISIT (OUTPATIENT)
Dept: CARDIOLOGY CLINIC | Age: 46
End: 2021-10-29
Payer: COMMERCIAL

## 2021-10-29 DIAGNOSIS — I48.92 ATRIAL FLUTTER, UNSPECIFIED TYPE (HCC): Primary | ICD-10-CM

## 2021-10-29 DIAGNOSIS — Z79.01 LONG TERM CURRENT USE OF ANTICOAGULANT THERAPY: ICD-10-CM

## 2021-10-29 LAB
INR BLD: 2
PT POC: 24.3 SECONDS
VALID INTERNAL CONTROL?: YES

## 2021-10-29 PROCEDURE — 85610 PROTHROMBIN TIME: CPT | Performed by: NURSE PRACTITIONER

## 2021-12-03 ENCOUNTER — ANTI-COAG VISIT (OUTPATIENT)
Dept: CARDIOLOGY CLINIC | Age: 46
End: 2021-12-03
Payer: COMMERCIAL

## 2021-12-03 DIAGNOSIS — Z79.01 LONG TERM CURRENT USE OF ANTICOAGULANT THERAPY: ICD-10-CM

## 2021-12-03 DIAGNOSIS — I48.92 ATRIAL FLUTTER, UNSPECIFIED TYPE (HCC): Primary | ICD-10-CM

## 2021-12-03 LAB
INR BLD: 2.4
PT POC: 28.6 SECONDS
VALID INTERNAL CONTROL?: YES

## 2021-12-03 PROCEDURE — 85610 PROTHROMBIN TIME: CPT | Performed by: NURSE PRACTITIONER

## 2021-12-08 RX ORDER — METOPROLOL SUCCINATE 25 MG/1
TABLET, EXTENDED RELEASE ORAL
Qty: 180 TABLET | Refills: 0 | Status: SHIPPED | OUTPATIENT
Start: 2021-12-08 | End: 2022-03-10

## 2022-01-06 ENCOUNTER — TELEPHONE (OUTPATIENT)
Dept: CARDIOLOGY CLINIC | Age: 47
End: 2022-01-06

## 2022-01-06 NOTE — TELEPHONE ENCOUNTER
Pt had called in yesterday because he was considering getting the pneumoccal vaccine and he wanted to know what Dr Amee Trinidad would think about that. Today, reviewed with Dr Amee Trinidad pt question and per Dr Amee Trinidad \" I'm not against him getting it. It would be his choice\". Called pt back, told him Dr Chevy Prince response, and pt was pleased with answer. He asked nurse if she knew where he could get it, and nurse suggested seeing if they would offer it at his PCP office. Pt said he would look into it. Pt content with call.

## 2022-01-07 ENCOUNTER — ANTI-COAG VISIT (OUTPATIENT)
Dept: CARDIOLOGY CLINIC | Age: 47
End: 2022-01-07
Payer: COMMERCIAL

## 2022-01-07 DIAGNOSIS — I48.92 ATRIAL FLUTTER, UNSPECIFIED TYPE (HCC): Primary | ICD-10-CM

## 2022-01-07 DIAGNOSIS — Z79.01 LONG TERM CURRENT USE OF ANTICOAGULANT THERAPY: ICD-10-CM

## 2022-01-07 LAB
INR BLD: 2.4
PT POC: 28.7 SECONDS
VALID INTERNAL CONTROL?: YES

## 2022-01-07 PROCEDURE — 85610 PROTHROMBIN TIME: CPT | Performed by: NURSE PRACTITIONER

## 2022-02-04 ENCOUNTER — ANTI-COAG VISIT (OUTPATIENT)
Dept: CARDIOLOGY CLINIC | Age: 47
End: 2022-02-04
Payer: COMMERCIAL

## 2022-02-04 DIAGNOSIS — Z79.01 LONG TERM CURRENT USE OF ANTICOAGULANT THERAPY: ICD-10-CM

## 2022-02-04 DIAGNOSIS — I48.92 ATRIAL FLUTTER, UNSPECIFIED TYPE (HCC): Primary | ICD-10-CM

## 2022-02-04 LAB
INR BLD: 1.7
PT POC: 20.2 SECONDS
VALID INTERNAL CONTROL?: YES

## 2022-02-04 PROCEDURE — 85610 PROTHROMBIN TIME: CPT | Performed by: NURSE PRACTITIONER

## 2022-02-04 NOTE — PROGRESS NOTES
The INR is below the therapeutic range.   Has been eating a salad daily  Please make the following adjustments to Coumadin dosing: Increase coumadin to 5 mg daily except 2.5 mg on Mon-Wed-Fri  Repeat the INR in 4 weeks

## 2022-03-04 ENCOUNTER — ANTI-COAG VISIT (OUTPATIENT)
Dept: CARDIOLOGY CLINIC | Age: 47
End: 2022-03-04
Payer: COMMERCIAL

## 2022-03-04 DIAGNOSIS — Z79.01 LONG TERM CURRENT USE OF ANTICOAGULANT THERAPY: ICD-10-CM

## 2022-03-04 DIAGNOSIS — I48.92 ATRIAL FLUTTER, UNSPECIFIED TYPE (HCC): Primary | ICD-10-CM

## 2022-03-04 LAB
INR BLD: 2.1
PT POC: 25.4 SECONDS
VALID INTERNAL CONTROL?: YES

## 2022-03-04 PROCEDURE — 85610 PROTHROMBIN TIME: CPT | Performed by: NURSE PRACTITIONER

## 2022-03-04 NOTE — PROGRESS NOTES
The INR is stable and therapeutic.    Continue Coumadin to 5 mg daily except 2.5 mg on Mon-Wed-Fri  Recheck INR in 4 weeks

## 2022-03-10 RX ORDER — METOPROLOL SUCCINATE 25 MG/1
TABLET, EXTENDED RELEASE ORAL
Qty: 180 TABLET | Refills: 0 | Status: SHIPPED | OUTPATIENT
Start: 2022-03-10 | End: 2022-06-09

## 2022-03-18 PROBLEM — Z79.899 ENCOUNTER FOR LONG-TERM (CURRENT) USE OF MEDICATIONS: Status: ACTIVE | Noted: 2017-11-27

## 2022-03-19 PROBLEM — M25.522 ELBOW PAIN, LEFT: Status: ACTIVE | Noted: 2017-11-27

## 2022-04-01 ENCOUNTER — ANTI-COAG VISIT (OUTPATIENT)
Dept: CARDIOLOGY CLINIC | Age: 47
End: 2022-04-01
Payer: COMMERCIAL

## 2022-04-01 DIAGNOSIS — I48.92 ATRIAL FLUTTER, UNSPECIFIED TYPE (HCC): Primary | ICD-10-CM

## 2022-04-01 DIAGNOSIS — Z79.01 LONG TERM CURRENT USE OF ANTICOAGULANT THERAPY: ICD-10-CM

## 2022-04-01 LAB
INR BLD: 2.2
PT POC: 26.7 SECONDS
VALID INTERNAL CONTROL?: YES

## 2022-04-01 PROCEDURE — 85610 PROTHROMBIN TIME: CPT | Performed by: NURSE PRACTITIONER

## 2022-04-29 ENCOUNTER — ANTI-COAG VISIT (OUTPATIENT)
Dept: CARDIOLOGY CLINIC | Age: 47
End: 2022-04-29
Payer: COMMERCIAL

## 2022-04-29 DIAGNOSIS — I48.92 ATRIAL FLUTTER, UNSPECIFIED TYPE (HCC): Primary | ICD-10-CM

## 2022-04-29 DIAGNOSIS — Z79.01 LONG TERM CURRENT USE OF ANTICOAGULANT THERAPY: ICD-10-CM

## 2022-04-29 LAB
INR BLD: 2.4
PT POC: 28.8 SECONDS
VALID INTERNAL CONTROL?: YES

## 2022-04-29 PROCEDURE — 85610 PROTHROMBIN TIME: CPT | Performed by: NURSE PRACTITIONER

## 2022-05-21 NOTE — PATIENT INSTRUCTIONS
Perform stretches daily, wear brace regularly, use medication as prescribed, and return to the office in about 6 weeks. Search YouTube for my channel: 
 
Dr. José Luis Kern Carpal Tunnel Back Pain    Back pain is very common in adults. The cause of back pain is rarely dangerous and the pain often gets better over time. The cause of your back pain may not be known and may include strain of muscles or ligaments, degeneration of the spinal disks, or arthritis. Occasionally the pain may radiate down your leg(s). Over-the-counter medicines to reduce pain and inflammation are often the most helpful. Stretching and remaining active frequently helps the healing process.  take medications as prescribed   follow up with your doctor in 1-2days     SEEK IMMEDIATE MEDICAL CARE IF YOU HAVE ANY OF THE FOLLOWING SYMPTOMS: bowel or bladder control problems, unusual weakness or numbness in your arms or legs, nausea or vomiting, abdominal pain, fever, dizziness/lightheadedness.

## 2022-06-03 ENCOUNTER — ANTI-COAG VISIT (OUTPATIENT)
Dept: CARDIOLOGY CLINIC | Age: 47
End: 2022-06-03
Payer: COMMERCIAL

## 2022-06-03 DIAGNOSIS — Z79.01 LONG TERM CURRENT USE OF ANTICOAGULANT THERAPY: ICD-10-CM

## 2022-06-03 DIAGNOSIS — I48.92 ATRIAL FLUTTER, UNSPECIFIED TYPE (HCC): Primary | ICD-10-CM

## 2022-06-03 LAB
INR BLD: 2.7
PT POC: 32.3 SECONDS
VALID INTERNAL CONTROL?: YES

## 2022-06-03 PROCEDURE — 85610 PROTHROMBIN TIME: CPT | Performed by: NURSE PRACTITIONER

## 2022-06-03 NOTE — PROGRESS NOTES
The INR is stable and therapeutic.   Continue Coumadin to 5 mg daily except 2.5 mg on Mon-Wed-Fri  Recheck INR in 4 weeks 04-Jun-2019

## 2022-06-09 RX ORDER — METOPROLOL SUCCINATE 25 MG/1
TABLET, EXTENDED RELEASE ORAL
Qty: 180 TABLET | Refills: 0 | Status: SHIPPED | OUTPATIENT
Start: 2022-06-09 | End: 2022-09-12 | Stop reason: SDUPTHER

## 2022-07-12 RX ORDER — WARFARIN SODIUM 5 MG/1
TABLET ORAL
Qty: 45 TABLET | Refills: 6 | Status: SHIPPED | OUTPATIENT
Start: 2022-07-12

## 2022-07-15 ENCOUNTER — ANTI-COAG VISIT (OUTPATIENT)
Dept: CARDIOLOGY CLINIC | Age: 47
End: 2022-07-15
Payer: COMMERCIAL

## 2022-07-15 DIAGNOSIS — I48.92 ATRIAL FLUTTER, UNSPECIFIED TYPE (HCC): Primary | ICD-10-CM

## 2022-07-15 DIAGNOSIS — Z79.01 LONG TERM CURRENT USE OF ANTICOAGULANT THERAPY: ICD-10-CM

## 2022-07-15 LAB
INR BLD: 1.9
PT POC: 23.1 SECONDS
VALID INTERNAL CONTROL?: YES

## 2022-07-15 PROCEDURE — 85610 PROTHROMBIN TIME: CPT | Performed by: NURSE PRACTITIONER

## 2022-07-15 NOTE — PROGRESS NOTES
The INR is below the therapeutic range.   States he missed a dose last week  Please make the following adjustments to Coumadin dosinmg today then Continue Coumadin to 5 mg daily except 2.5 mg on Mon-Wed-Fri  Repeat the INR in 4 weeks

## 2022-08-12 ENCOUNTER — ANTI-COAG VISIT (OUTPATIENT)
Dept: CARDIOLOGY CLINIC | Age: 47
End: 2022-08-12
Payer: COMMERCIAL

## 2022-08-12 DIAGNOSIS — Z79.01 LONG TERM CURRENT USE OF ANTICOAGULANT THERAPY: ICD-10-CM

## 2022-08-12 DIAGNOSIS — I48.92 ATRIAL FLUTTER, UNSPECIFIED TYPE (HCC): Primary | ICD-10-CM

## 2022-08-12 LAB
INR BLD: 2.2
PT POC: 26.3 SECONDS
VALID INTERNAL CONTROL?: YES

## 2022-08-12 PROCEDURE — 85610 PROTHROMBIN TIME: CPT | Performed by: INTERNAL MEDICINE

## 2022-08-12 NOTE — PROGRESS NOTES
Verbal order and read back per Dr. Evelin Anne  INR within therapeutic range  Continue Coumadin to 5 mg daily except 2.5 mg on Mon-Wed-Fri. INR recheck in 4 weeks. Patient made aware of dosing and recheck instructions, no questions.

## 2022-09-12 RX ORDER — METOPROLOL SUCCINATE 25 MG/1
25 TABLET, EXTENDED RELEASE ORAL 2 TIMES DAILY
Qty: 180 TABLET | Refills: 3 | Status: SHIPPED | OUTPATIENT
Start: 2022-09-12

## 2022-09-28 ENCOUNTER — OFFICE VISIT (OUTPATIENT)
Dept: CARDIOLOGY CLINIC | Age: 47
End: 2022-09-28
Payer: COMMERCIAL

## 2022-09-28 VITALS
WEIGHT: 192 LBS | OXYGEN SATURATION: 97 % | HEIGHT: 70 IN | SYSTOLIC BLOOD PRESSURE: 96 MMHG | HEART RATE: 85 BPM | BODY MASS INDEX: 27.49 KG/M2 | DIASTOLIC BLOOD PRESSURE: 64 MMHG

## 2022-09-28 DIAGNOSIS — Z87.74 S/P VSD REPAIR: ICD-10-CM

## 2022-09-28 DIAGNOSIS — I48.3 TYPICAL ATRIAL FLUTTER (HCC): ICD-10-CM

## 2022-09-28 DIAGNOSIS — I48.92 ATRIAL FLUTTER, UNSPECIFIED TYPE (HCC): ICD-10-CM

## 2022-09-28 DIAGNOSIS — Z86.79 S/P ABLATION OF ATRIAL FLUTTER: ICD-10-CM

## 2022-09-28 DIAGNOSIS — I34.2 NON-RHEUMATIC MITRAL VALVE STENOSIS: ICD-10-CM

## 2022-09-28 DIAGNOSIS — Z79.01 LONG TERM CURRENT USE OF ANTICOAGULANT THERAPY: ICD-10-CM

## 2022-09-28 DIAGNOSIS — Z98.890 S/P ABLATION OF ATRIAL FLUTTER: ICD-10-CM

## 2022-09-28 DIAGNOSIS — Z98.890 S/P MITRAL VALVE REPAIR: Primary | ICD-10-CM

## 2022-09-28 LAB
INR BLD: 2.7
PT POC: 32.8 SECONDS
VALID INTERNAL CONTROL?: YES

## 2022-09-28 PROCEDURE — 93000 ELECTROCARDIOGRAM COMPLETE: CPT | Performed by: INTERNAL MEDICINE

## 2022-09-28 PROCEDURE — 85610 PROTHROMBIN TIME: CPT | Performed by: INTERNAL MEDICINE

## 2022-09-28 PROCEDURE — 99214 OFFICE O/P EST MOD 30 MIN: CPT | Performed by: INTERNAL MEDICINE

## 2022-09-28 NOTE — PROGRESS NOTES
Darryl Santos presents today for   Chief Complaint   Patient presents with    Follow-up     1 year        Darryl Santos preferred language for health care discussion is english/other. Is someone accompanying this pt? no    Is the patient using any DME equipment during [address]? no    Depression Screening:  3 most recent PHQ Screens 9/29/2021   Little interest or pleasure in doing things Not at all   Feeling down, depressed, irritable, or hopeless Not at all   Total Score PHQ 2 0       Learning Assessment:  Learning Assessment 9/29/2021   PRIMARY LEARNER Patient   HIGHEST LEVEL OF EDUCATION - PRIMARY LEARNER  -   BARRIERS PRIMARY LEARNER -   CO-LEARNER CAREGIVER -   PRIMARY LANGUAGE ENGLISH   LEARNER PREFERENCE PRIMARY DEMONSTRATION   ANSWERED BY patient   RELATIONSHIP SELF       Abuse Screening:  Abuse Screening Questionnaire 9/29/2021   Do you ever feel afraid of your partner? N   Are you in a relationship with someone who physically or mentally threatens you? N   Is it safe for you to go home? Y       Fall Risk  Fall Risk Assessment, last 12 mths 7/16/2020   Able to walk? Yes   Fall in past 12 months? No       Pt currently taking Anticoagulant therapy? Warfarin and ASA 81mg     Coordination of Care:  1. Have you been to the ER, urgent care clinic since your last visit? Hospitalized since your last visit? no    2. Have you seen or consulted any other health care providers outside of the [address] [place] [address] since your last visit? Include any pap smears or colon screening.  no

## 2022-09-28 NOTE — PROGRESS NOTES
History of Present Illness:  51 YOM here for follow up. He continues to be a , working regularly without limitation. He tried to do some exercise for a while, but laxed(?) a little as he had squamous cell malignancy removed. No new chest pain, dyspnea, PND, orthopnea, edema. No bleeding issues on Coumadin. Impression:  Hx of paroxysmal atypical atrial flutter with right sided figure 8 ablation June 2013 by Dr. Brie Villareal and redo January 2018, stable. Chronic RBBB. Chronic Coumadin use, which will be continued indefinitely due to history of recurrent atrial flutter and valve disease. Moderate mitral stenosis, mean gradient 9 mmHg September 2021 with normal EF.  7 mmHg in 2017. Hx of VSD repair and mitral valve ring in 1992. Hx of transient cardiomyopathy and heart failure, improved after ablation. Nuclear stress test 2014 without ischemia. Plan:  He has unchanged RBBB, INR 2.7 today without bleeding issues. No recurrent clinical arrhythmias. Given his hx of mitral valve ring and previous moderate mitral stenosis, I would like to repeat an echocardiogram.  If minimally changed, I will see back annually. Past Medical History:   Diagnosis Date    Anxiety     Atrial flutter with rapid ventricular response [facility]) August 2013    Status post cardioversion    Cardiac Holter exam 07/20/2015    Normal 48-hr Holter. Sinus rhythm, avg HR 78 bpm.  No ventricular ectopy. Very rare supraventricular ectopy. Cardiac nuclear imaging test 03/18/2014    No ischemia or prior infarction. No RWMA. EF 49%. Nondiagnostic EKG on max EST due to abnormal baseline. Ex time 13 min 5 sec. Cardiac transesophageal echocardiography (ROCAEL) 11/25/2013    EF 35%. Mod diffuse hypk. Marked LAE. Dilated LA appendage w/no thrombus. No L-R or R-L atrial septal shunt by contrast.  Marked TYSON. MV annular ring prosthesis. Mod MS. Mild MR.       Cardiomyopathy ([address]     EF 40-45%    Herniated disc Mitral valve stenosis, moderate     S/P mitral valve repair     severe MR    S/P VSD repair     Vitamin D deficiency 12/10/2014       Current Outpatient Medications   Medication Sig Dispense Refill    metoprolol succinate (TOPROL-XL) 25 mg XL tablet Take 1 Tablet by mouth two (2) times a day. 180 Tablet 3    warfarin (COUMADIN) 5 mg tablet TAKE 1 TABLET BY MOUTH ONCE DAILY OR AS DIRECTED 45 Tablet 6    metoprolol tartrate (LOPRESSOR) 25 mg tablet Take 25 mg by mouth two (2) times a day. aspirin delayed-release 81 mg tablet Take  by mouth daily. cholecalciferol (VITAMIN D3) 1,000 unit tablet Take 1 Tab by mouth daily. (Patient taking differently: Take 1,000 Units by mouth daily. 2 tabs daily) 30 Tab 3       Social History   reports that he has never smoked. He has never used smokeless tobacco.   reports current alcohol use. Family History  family history includes Diabetes in his maternal grandmother. Review of Systems  Except as stated above include:  Constitutional: Negative for fever, chills and malaise/fatigue. HEENT: No congestion or recent URI. Gastrointestinal: No nausea, vomiting, abdominal pain, bloody stools. Pulmonary:  Negative except as stated above. Cardiac:  Negative except as stated above. Musculoskeletal: Negative except as stated above. Neurological:  No localized symptoms. Skin:  Negative except as stated above. Psych:  Negative except as stated above. Endocrine:  Negative except as stated above. PHYSICAL EXAM  BP Readings from Last 3 Encounters:   09/28/22 96/64   09/29/21 120/70   09/29/21 116/75     Pulse Readings from Last 3 Encounters:   09/28/22 85   09/29/21 79   03/08/21 83     Wt Readings from Last 3 Encounters:   09/28/22 87.1 kg (192 lb)   09/29/21 93.4 kg (206 lb)   09/29/21 93 kg (205 lb)     General:   Well developed, well groomed. Head/Neck:   No obvious jugular venous distention     No obvious carotid pulsations.       No evidence of xanthelasma. Lungs:   No respiratory distress. Clear bilaterally. Heart:  Regular rate and rhythm. Normal S1/S2. Palpation grossly normal.    Faint apical diastolic murmur. Abdomen:   Non-acute abdomen. No obvious pulsations. Extremities:   Intact peripheral pulses. No significant edema. Neurological:   Alert and oriented to person, place, time. No focal neurological deficit visually. Skin:   No obvious rash    Blood Pressure Metric:  Monitor recommended and adjustments stated if needed.

## 2022-09-28 NOTE — PATIENT INSTRUCTIONS
The INR is stable and therapeutic.  Continue same dose of coumadin (Continue Coumadin to 5 mg daily except 2.5 mg on Mon-Wed-Fri)  Recheck 4 weeks

## 2022-11-04 ENCOUNTER — ANTI-COAG VISIT (OUTPATIENT)
Dept: CARDIOLOGY CLINIC | Age: 47
End: 2022-11-04
Payer: COMMERCIAL

## 2022-11-04 DIAGNOSIS — Z79.01 LONG TERM CURRENT USE OF ANTICOAGULANT THERAPY: ICD-10-CM

## 2022-11-04 DIAGNOSIS — I48.92 ATRIAL FLUTTER, UNSPECIFIED TYPE (HCC): Primary | ICD-10-CM

## 2022-11-04 LAB
INR BLD: 2.4
PT POC: 28.6 SECONDS
VALID INTERNAL CONTROL?: YES

## 2022-11-04 PROCEDURE — 85610 PROTHROMBIN TIME: CPT | Performed by: NURSE PRACTITIONER

## 2022-11-04 NOTE — PROGRESS NOTES
The INR is stable and therapeutic. Began Augmentin BID on 11/2/22, will take for 7 days.   See calendar for dose change x 1 week and then resume Coumadin to 5 mg daily except 2.5 mg on Mon-Wed-Fri  Recheck INR in 2 weeks

## 2022-11-18 ENCOUNTER — ANTI-COAG VISIT (OUTPATIENT)
Dept: CARDIOLOGY CLINIC | Age: 47
End: 2022-11-18
Payer: COMMERCIAL

## 2022-11-18 DIAGNOSIS — Z79.01 LONG TERM CURRENT USE OF ANTICOAGULANT THERAPY: ICD-10-CM

## 2022-11-18 DIAGNOSIS — I48.3 TYPICAL ATRIAL FLUTTER (HCC): Primary | ICD-10-CM

## 2022-11-18 LAB
INR BLD: 2.1
PT POC: 25.2 SECONDS
VALID INTERNAL CONTROL?: YES

## 2022-11-18 PROCEDURE — 85610 PROTHROMBIN TIME: CPT | Performed by: NURSE PRACTITIONER

## 2022-12-16 ENCOUNTER — ANTI-COAG VISIT (OUTPATIENT)
Dept: CARDIOLOGY CLINIC | Age: 47
End: 2022-12-16
Payer: COMMERCIAL

## 2022-12-16 DIAGNOSIS — Z79.01 LONG TERM CURRENT USE OF ANTICOAGULANT THERAPY: ICD-10-CM

## 2022-12-16 DIAGNOSIS — I48.92 ATRIAL FLUTTER, UNSPECIFIED TYPE (HCC): Primary | ICD-10-CM

## 2022-12-16 LAB
INR BLD: 3.5
PT POC: 42.2 SECONDS
VALID INTERNAL CONTROL?: YES

## 2022-12-16 NOTE — PROGRESS NOTES
The INR is above the therapeutic range. Ask the patient about bleeding complications. Please make the following adjustments to Coumadin dosing: Hold x 1 then Continue Coumadin to 5 mg daily except 2.5 mg on Mon-Wed-Fri  Repeat the INR in 4 weeks.

## 2023-01-13 ENCOUNTER — ANTI-COAG VISIT (OUTPATIENT)
Dept: CARDIOLOGY CLINIC | Age: 48
End: 2023-01-13
Payer: COMMERCIAL

## 2023-01-13 DIAGNOSIS — I48.92 ATRIAL FLUTTER, UNSPECIFIED TYPE (HCC): Primary | ICD-10-CM

## 2023-01-13 DIAGNOSIS — Z79.01 LONG TERM CURRENT USE OF ANTICOAGULANT THERAPY: ICD-10-CM

## 2023-01-13 LAB
INR BLD: 2.3
PT POC: 27.9 SECONDS
VALID INTERNAL CONTROL?: YES

## 2023-01-13 NOTE — PROGRESS NOTES
The INR is stable and therapeutic. Continue Coumadin to 5 mg daily except 2.5 mg on Mon-Wed-Fri  Recheck in 4 weeks.

## 2023-02-10 ENCOUNTER — ANTI-COAG VISIT (OUTPATIENT)
Dept: CARDIOLOGY CLINIC | Age: 48
End: 2023-02-10
Payer: COMMERCIAL

## 2023-02-10 DIAGNOSIS — I48.92 ATRIAL FLUTTER, UNSPECIFIED TYPE (HCC): Primary | ICD-10-CM

## 2023-02-10 DIAGNOSIS — Z79.01 LONG TERM CURRENT USE OF ANTICOAGULANT THERAPY: ICD-10-CM

## 2023-02-10 LAB
INR BLD: 1.9
PT POC: 22.8 SECONDS
VALID INTERNAL CONTROL?: YES

## 2023-02-10 NOTE — PROGRESS NOTES
The INR is below the therapeutic range. Please make the following adjustments to Coumadin dosinmg today then Continue Coumadin to 5 mg daily except 2.5 mg on Mon-Wed-Fri   Repeat the INR in 4 weeks.

## 2023-03-06 NOTE — TELEPHONE ENCOUNTER
Pt wife called stating her  and her has been around there niece and she was tested for the flu and they stated this strand of flu is not covered by the vaccine can you call in the tamiflu for him please advise [phone]
Rx tamiflu sent.
Spoke with patient advised rx was sent
You can access the FollowMyHealth Patient Portal offered by [facility] by registering at the following website: [email or web address]. By joining [employer]’s FollowMyHealth portal, you will also be able to view your health information using other applications (apps) compatible with our system.

## 2023-03-10 ENCOUNTER — ANTI-COAG VISIT (OUTPATIENT)
Age: 48
End: 2023-03-10
Payer: COMMERCIAL

## 2023-03-10 DIAGNOSIS — Z79.899 ENCOUNTER FOR LONG-TERM (CURRENT) USE OF MEDICATIONS: Primary | ICD-10-CM

## 2023-03-10 DIAGNOSIS — I48.92 ATRIAL FLUTTER, UNSPECIFIED TYPE (HCC): ICD-10-CM

## 2023-03-10 LAB
POC INR: 2.4
PROTHROMBIN TIME, POC: 28.5

## 2023-03-10 PROCEDURE — 85610 PROTHROMBIN TIME: CPT | Performed by: NURSE PRACTITIONER

## 2023-04-06 ENCOUNTER — ANTI-COAG VISIT (OUTPATIENT)
Age: 48
End: 2023-04-06

## 2023-04-06 DIAGNOSIS — Z79.899 ENCOUNTER FOR LONG-TERM (CURRENT) USE OF MEDICATIONS: Primary | ICD-10-CM

## 2023-04-06 DIAGNOSIS — I48.92 ATRIAL FLUTTER (HCC): ICD-10-CM

## 2023-04-06 LAB
POC INR: 2.1
PROTHROMBIN TIME, POC: NORMAL

## 2023-04-06 NOTE — PROGRESS NOTES
Mr. John Velazquez is here today for anticoagulation monitoring for the diagnosis of atrial fibrillation. His INR goal is 2.0-3.0 and his current Coumadin dose is 5 alt 2.5 mg. Today's findings include an INR of 2.1     Considering Mr. Howe Fear past history, todays findings, and per the coumadin policy/protocol, Mr. Bryan Alvarado was instructed to take Coumadin as follows,  same dose. He was also instructed to come back in 4 weeks for an INR check. A full discussion of the nature of anticoagulants has been carried out. A full discussion of the need for frequent and regular monitoring, precise dosage adjustment and compliance was stressed. Side effects of potential bleeding were discussed and Mr. Bryan Alvarado was instructed to call [phone] if there are any signs of abnormal bleeding. Mr. Bryan Alvarado was instructed to avoid any OTC items containing aspirin or ibuprofen and prior to starting any new OTC products to consult with his physician or pharmacist to ensure no drug interactions are present. Mr. Bryan Alvarado was instructed to avoid any major changes in his general diet and to avoid alcohol consumption. .      Mr. Bryan Alvarado verbalized his understanding of all instructions and will call the office with any questions, concerns, or signs of abnormal bleeding or blood clot.

## 2023-05-22 ENCOUNTER — ANTI-COAG VISIT (OUTPATIENT)
Age: 48
End: 2023-05-22
Payer: COMMERCIAL

## 2023-05-22 DIAGNOSIS — I48.92 ATRIAL FLUTTER, UNSPECIFIED TYPE (HCC): Primary | ICD-10-CM

## 2023-05-22 DIAGNOSIS — Z79.899 ENCOUNTER FOR LONG-TERM (CURRENT) USE OF MEDICATIONS: ICD-10-CM

## 2023-05-22 LAB
POC INR: 4.7
PROTHROMBIN TIME, POC: ABNORMAL

## 2023-05-22 PROCEDURE — 85610 PROTHROMBIN TIME: CPT | Performed by: INTERNAL MEDICINE

## 2023-06-05 ENCOUNTER — ANTI-COAG VISIT (OUTPATIENT)
Age: 48
End: 2023-06-05
Payer: COMMERCIAL

## 2023-06-05 DIAGNOSIS — I48.92 ATRIAL FLUTTER, UNSPECIFIED TYPE (HCC): ICD-10-CM

## 2023-06-05 DIAGNOSIS — Z79.899 ENCOUNTER FOR LONG-TERM (CURRENT) USE OF MEDICATIONS: Primary | ICD-10-CM

## 2023-06-05 LAB
POC INR: 3.4
PROTHROMBIN TIME, POC: ABNORMAL

## 2023-06-05 PROCEDURE — 85610 PROTHROMBIN TIME: CPT | Performed by: INTERNAL MEDICINE

## 2023-07-03 ENCOUNTER — ANTI-COAG VISIT (OUTPATIENT)
Age: 48
End: 2023-07-03
Payer: COMMERCIAL

## 2023-07-03 DIAGNOSIS — I48.92 ATRIAL FLUTTER, UNSPECIFIED TYPE (HCC): ICD-10-CM

## 2023-07-03 DIAGNOSIS — Z79.899 ENCOUNTER FOR LONG-TERM (CURRENT) USE OF MEDICATIONS: Primary | ICD-10-CM

## 2023-07-03 LAB
POC INR: 3.9
PROTHROMBIN TIME, POC: ABNORMAL

## 2023-07-03 PROCEDURE — 85610 PROTHROMBIN TIME: CPT | Performed by: INTERNAL MEDICINE

## 2023-07-03 NOTE — PROGRESS NOTES
Mr. Bailey Thurman is here today for anticoagulation monitoring for the diagnosis of atrial fibrillation. His INR goal is 2.0-3.0 and his current Coumadin dose is Continue Coumadin to 5 mg daily except 2.5 mg on Mon-Wed-Fri . Today's findings include an INR of 3.9     Considering Mr. Bryce Lanier past history, todays findings, and per the coumadin policy/protocol, Mr. Esha Diaz was instructed to take Coumadin as follows,  HOLD x2 then same . He was also instructed to come back in 2 weeks for an INR check. A full discussion of the nature of anticoagulants has been carried out. A full discussion of the need for frequent and regular monitoring, precise dosage adjustment and compliance was stressed. Side effects of potential bleeding were discussed and Mr. Esha Diaz was instructed to call [phone] if there are any signs of abnormal bleeding. Mr. Esha Diaz was instructed to avoid any OTC items containing aspirin or ibuprofen and prior to starting any new OTC products to consult with his physician or pharmacist to ensure no drug interactions are present. Mr. Esha Diaz was instructed to avoid any major changes in his general diet and to avoid alcohol consumption. .      Mr. Esha Diaz verbalized his understanding of all instructions and will call the office with any questions, concerns, or signs of abnormal bleeding or blood clot.

## 2023-07-11 ENCOUNTER — TELEPHONE (OUTPATIENT)
Age: 48
End: 2023-07-11

## 2023-07-17 ENCOUNTER — ANTI-COAG VISIT (OUTPATIENT)
Age: 48
End: 2023-07-17
Payer: COMMERCIAL

## 2023-07-17 DIAGNOSIS — Z79.899 ENCOUNTER FOR LONG-TERM (CURRENT) USE OF MEDICATIONS: ICD-10-CM

## 2023-07-17 DIAGNOSIS — I48.92 ATRIAL FLUTTER, UNSPECIFIED TYPE (HCC): Primary | ICD-10-CM

## 2023-07-17 LAB
POC INR: 3.4
PROTHROMBIN TIME, POC: ABNORMAL

## 2023-07-17 PROCEDURE — 85610 PROTHROMBIN TIME: CPT | Performed by: INTERNAL MEDICINE

## 2023-07-17 NOTE — PROGRESS NOTES
Mr. Lorne Timmons is here today for anticoagulation monitoring for the diagnosis of bio prosthetic valve replacement. His INR goal is 2.0-3.0 and his current Coumadin dose is Continue Coumadin to 5 mg daily except 2.5 mg on Mon-Wed-Fri . Today's findings include an INR of 3.4     Considering Mr. Meng Common past history, todays findings, and per the coumadin policy/protocol, Mr. Ekaterina Frost was instructed to take Coumadin as follows,  same per Dr. John Valderrama. He was also instructed to come back in 2 weeks for an INR check. A full discussion of the nature of anticoagulants has been carried out. A full discussion of the need for frequent and regular monitoring, precise dosage adjustment and compliance was stressed. Side effects of potential bleeding were discussed and Mr. Ekaterina Frost was instructed to call [phone] if there are any signs of abnormal bleeding. Mr. Ekaterina Frost was instructed to avoid any OTC items containing aspirin or ibuprofen and prior to starting any new OTC products to consult with his physician or pharmacist to ensure no drug interactions are present. Mr. Ekaterina Frost was instructed to avoid any major changes in his general diet and to avoid alcohol consumption. .      Mr. Ekaterina Frost verbalized his understanding of all instructions and will call the office with any questions, concerns, or signs of abnormal bleeding or blood clot.

## 2023-07-18 NOTE — TELEPHONE ENCOUNTER
Gucci Tucker MD  You Yesterday (9:41 AM)       Let's order another echo, followup valve, then can see me or dani.  Thanks

## 2023-07-18 NOTE — TELEPHONE ENCOUNTER
Spoke to patient. Patient is scheduled for echo on 7/25 at 8:30 and a follow up on 8/10 with Dr. Merari Deshpande.

## 2023-07-25 DIAGNOSIS — I34.2 NONRHEUMATIC MITRAL (VALVE) STENOSIS: Primary | ICD-10-CM

## 2023-07-28 DIAGNOSIS — Z79.899 ENCOUNTER FOR LONG-TERM (CURRENT) USE OF MEDICATIONS: Primary | ICD-10-CM

## 2023-07-28 DIAGNOSIS — I48.92 ATRIAL FLUTTER, UNSPECIFIED TYPE (HCC): ICD-10-CM

## 2023-07-31 ENCOUNTER — HOSPITAL ENCOUNTER (OUTPATIENT)
Facility: HOSPITAL | Age: 48
Discharge: HOME | End: 2023-08-03

## 2023-07-31 LAB — LABCORP SPECIMEN COLLECTION: NORMAL

## 2023-08-01 LAB
INR PPP: 2 (ref 0.9–1.2)
PROTHROMBIN TIME: 20.6 SEC (ref 9.1–12)

## 2023-08-10 ENCOUNTER — OFFICE VISIT (OUTPATIENT)
Age: 48
End: 2023-08-10
Payer: COMMERCIAL

## 2023-08-10 VITALS
DIASTOLIC BLOOD PRESSURE: 70 MMHG | SYSTOLIC BLOOD PRESSURE: 104 MMHG | HEIGHT: 70 IN | BODY MASS INDEX: 28.77 KG/M2 | OXYGEN SATURATION: 98 % | WEIGHT: 201 LBS | HEART RATE: 76 BPM

## 2023-08-10 DIAGNOSIS — I05.0 MITRAL VALVE STENOSIS, UNSPECIFIED ETIOLOGY: Primary | ICD-10-CM

## 2023-08-10 DIAGNOSIS — I35.1 AORTIC VALVE INSUFFICIENCY, ETIOLOGY OF CARDIAC VALVE DISEASE UNSPECIFIED: ICD-10-CM

## 2023-08-10 PROCEDURE — 99214 OFFICE O/P EST MOD 30 MIN: CPT | Performed by: INTERNAL MEDICINE

## 2023-08-10 PROCEDURE — 1036F TOBACCO NON-USER: CPT | Performed by: INTERNAL MEDICINE

## 2023-08-10 PROCEDURE — G8428 CUR MEDS NOT DOCUMENT: HCPCS | Performed by: INTERNAL MEDICINE

## 2023-08-10 PROCEDURE — G8419 CALC BMI OUT NRM PARAM NOF/U: HCPCS | Performed by: INTERNAL MEDICINE

## 2023-08-10 NOTE — PROGRESS NOTES
History of Present Illness:  49 YOM here for follow up. He works as a  and recently was vacationing in [place] with the [employer]. He did not have any limitations there and denies any new chest pain, dyspnea, PND, orthopnea or edema, but does have some occasional fatigue. No bleeding issues. Impression:  Paroxysmal atypical atrial flutter with right sided figure 8 ablation June 2013 by Dr. Rafael Calhoun, redo January 2018, now stable. Chronic Coumadin use, which will be continued indefinitely due to history of recurrent atrial flutter and valve disease. Moderate mitral stenosis with mean gradient 6 mmHg by echo July 2023 and normal EF. Moderate aortic insufficiency, eccentric, by echo July 2023. Remote transient cardiomyopathy, resolved after ablation. Remote VSD with repair of mitral valve ring in 1992. Plan: We will continue with Coumadin. There is no evidence of decompensated heart failure. I talked about his aortic and mitral valve disease, which we will continue to monitor. I can see back annually and I have asked him to monitor his blood pressure at home. Wt Readings from Last 3 Encounters:   08/10/23 201 lb (91.2 kg)   07/31/23 192 lb (87.1 kg)   12/13/22 192 lb (87.1 kg)     Past Medical History:   Diagnosis Date    Abnormal Holter exam 07/20/2015    Normal 48-hr Holter. Sinus rhythm, avg HR 78 bpm.  No ventricular ectopy. Very rare supraventricular ectopy. Anxiety     Atrial flutter with rapid ventricular response [facility]) August 2013    Status post cardioversion    Cardiomyopathy [facility])     EF 40-45%    Herniated disc     History of myocardial perfusion scan 03/18/2014    No ischemia or prior infarction. No RWMA. EF 49%. Nondiagnostic EKG on max EST due to abnormal baseline. Ex time 13 min 5 sec. Hx of transesophageal echocardiography ([facility]) for monitoring 11/25/2013    EF 35%. Mod diffuse hypk. Marked LAE. Dilated LA appendage w/no thrombus.   No L-R or R-L

## 2023-08-14 RX ORDER — WARFARIN SODIUM 5 MG/1
TABLET ORAL
Qty: 15 TABLET | Refills: 0 | Status: SHIPPED | OUTPATIENT
Start: 2023-08-14

## 2023-08-28 RX ORDER — WARFARIN SODIUM 5 MG/1
TABLET ORAL
Qty: 15 TABLET | Refills: 0 | Status: SHIPPED | OUTPATIENT
Start: 2023-08-28

## 2023-08-29 RX ORDER — METOPROLOL SUCCINATE 25 MG/1
25 TABLET, EXTENDED RELEASE ORAL 2 TIMES DAILY
Qty: 90 TABLET | Refills: 3 | Status: SHIPPED | OUTPATIENT
Start: 2023-08-29

## 2023-08-31 ENCOUNTER — ANTI-COAG VISIT (OUTPATIENT)
Age: 48
End: 2023-08-31

## 2023-08-31 DIAGNOSIS — I48.92 ATRIAL FLUTTER, UNSPECIFIED TYPE (HCC): ICD-10-CM

## 2023-08-31 DIAGNOSIS — Z79.899 ENCOUNTER FOR LONG-TERM (CURRENT) USE OF MEDICATIONS: Primary | ICD-10-CM

## 2023-08-31 LAB
POC INR: 3.3
PROTHROMBIN TIME, POC: ABNORMAL

## 2023-08-31 NOTE — PROGRESS NOTES
Mr. 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 His INR goal is 2.0-3.0 and his current Coumadin dose is Continue Coumadin to 5 mg daily except 2.5 mg on Mon-Wed-Fri . Today's findings include an INR of 3.3     Considering Mr. Niko Galindo past history, todays findings, and per the coumadin policy/protocol, Mr. Krystle Larkin was instructed to take Coumadin as follows,  take 1/2 tab today then resume . He was also instructed to come back in 4 weeks for an INR check. A full discussion of the nature of anticoagulants has been carried out. A full discussion of the need for frequent and regular monitoring, precise dosage adjustment and compliance was stressed. Side effects of potential bleeding were discussed and Mr. Krystle Larkin was instructed to call [phone] if there are any signs of abnormal bleeding. Mr. Krytsle Larkin was instructed to avoid any OTC items containing aspirin or ibuprofen and prior to starting any new OTC products to consult with his physician or pharmacist to ensure no drug interactions are present. Mr. Krystle Larkin was instructed to avoid any major changes in his general diet and to avoid alcohol consumption. .      Mr. Krystle Larkin verbalized his understanding of all instructions and will call the office with any questions, concerns, or signs of abnormal bleeding or blood clot.

## 2023-09-19 ENCOUNTER — OFFICE VISIT (OUTPATIENT)
Age: 48
End: 2023-09-19

## 2023-09-19 ENCOUNTER — HOSPITAL ENCOUNTER (OUTPATIENT)
Facility: HOSPITAL | Age: 48
Discharge: HOME | End: 2023-09-22

## 2023-09-19 VITALS — RESPIRATION RATE: 14 BRPM | WEIGHT: 206 LBS | HEIGHT: 70 IN | BODY MASS INDEX: 29.49 KG/M2

## 2023-09-19 DIAGNOSIS — M99.04 SACRAL REGION SOMATIC DYSFUNCTION: ICD-10-CM

## 2023-09-19 DIAGNOSIS — M99.09 SOMATIC DYSFUNCTION OF ABDOMINAL REGION: ICD-10-CM

## 2023-09-19 DIAGNOSIS — M99.05 PELVIC SOMATIC DYSFUNCTION: ICD-10-CM

## 2023-09-19 DIAGNOSIS — M75.41 SHOULDER IMPINGEMENT SYNDROME, RIGHT: ICD-10-CM

## 2023-09-19 DIAGNOSIS — M99.08 RIB CAGE REGION SOMATIC DYSFUNCTION: ICD-10-CM

## 2023-09-19 DIAGNOSIS — M99.06 LOWER LIMB REGION SOMATIC DYSFUNCTION: ICD-10-CM

## 2023-09-19 DIAGNOSIS — M99.02 THORACIC REGION SOMATIC DYSFUNCTION: ICD-10-CM

## 2023-09-19 DIAGNOSIS — M75.41 SHOULDER IMPINGEMENT SYNDROME, RIGHT: Primary | ICD-10-CM

## 2023-09-19 DIAGNOSIS — M99.07 UPPER EXTREMITY SOMATIC DYSFUNCTION: ICD-10-CM

## 2023-09-19 DIAGNOSIS — M99.01 CERVICAL SOMATIC DYSFUNCTION: ICD-10-CM

## 2023-09-19 DIAGNOSIS — M99.03 LUMBAR REGION SOMATIC DYSFUNCTION: ICD-10-CM

## 2023-09-19 RX ORDER — MELOXICAM 7.5 MG/1
7.5 TABLET ORAL DAILY PRN
Qty: 30 TABLET | Refills: 1 | Status: SHIPPED | OUTPATIENT
Start: 2023-09-19

## 2023-09-19 NOTE — PROGRESS NOTES
HISTORY OF PRESENT ILLNESS    Lluvia Maher 1975 is a 49y.o. year old male comes in today as new patient for: right shoulder pain    Patients symptoms have been present for 3-4 weeks. Pain level 7/10 superior. It has worsened with reach across body or over head. Patient has tried:  tylenol with mild benefit. It is described as pain as above w/o injury. IMAGING: XR right shoulder pending    Past Surgical History:   Procedure Laterality Date    ABLATION OF DYSRHYTHMIC FOCUS  2014    Flutter/fib    [facility] Adjutant    Dr. Andre Perez     Social History     Socioeconomic History    Marital status:      Spouse name: None    Number of children: None    Years of education: None    Highest education level: None   Tobacco Use    Smoking status: Never    Smokeless tobacco: Never   Substance and Sexual Activity    Alcohol use: Yes     Alcohol/week: 0.0 standard drinks of alcohol      Current Outpatient Medications   Medication Sig Dispense Refill    metoprolol succinate (TOPROL XL) 25 MG extended release tablet Take 1 tablet by mouth 2 times daily 90 tablet 3    warfarin (COUMADIN) 5 MG tablet TAKE 1 TABLET BY MOUTH ONCE DAILY AS DIRECTED (Patient taking differently: 5mg 4 days and 2.5 on 3 days) 15 tablet 0    aspirin 81 MG EC tablet Take by mouth daily      vitamin D (CHOLECALCIFEROL) 25 MCG (1000 UT) TABS tablet Take 1 tablet by mouth daily       No current facility-administered medications for this visit. Past Medical History:   Diagnosis Date    Abnormal Holter exam 07/20/2015    Normal 48-hr Holter. Sinus rhythm, avg HR 78 bpm.  No ventricular ectopy. Very rare supraventricular ectopy. Anxiety     Atrial flutter with rapid ventricular response [facility]) August 2013    Status post cardioversion    Cardiomyopathy [facility])     EF 40-45%    Herniated disc     History of myocardial perfusion scan 03/18/2014    No ischemia or prior infarction. No RWMA. EF 49%.   Nondiagnostic EKG on max EST

## 2023-09-27 RX ORDER — WARFARIN SODIUM 5 MG/1
5 TABLET ORAL DAILY
Qty: 90 TABLET | Refills: 3 | Status: SHIPPED | OUTPATIENT
Start: 2023-09-27

## 2023-09-27 RX ORDER — WARFARIN SODIUM 5 MG/1
TABLET ORAL
Qty: 15 TABLET | Refills: 0 | Status: SHIPPED | OUTPATIENT
Start: 2023-09-27 | End: 2023-09-27 | Stop reason: SDUPTHER

## 2023-09-28 ENCOUNTER — ANTI-COAG VISIT (OUTPATIENT)
Age: 48
End: 2023-09-28

## 2023-09-28 DIAGNOSIS — Z79.899 ENCOUNTER FOR LONG-TERM (CURRENT) USE OF MEDICATIONS: Primary | ICD-10-CM

## 2023-09-28 DIAGNOSIS — I48.92 ATRIAL FLUTTER, UNSPECIFIED TYPE (HCC): ICD-10-CM

## 2023-09-28 LAB
POC INR: 3.3
PROTHROMBIN TIME, POC: ABNORMAL

## 2023-09-28 NOTE — PROGRESS NOTES
Mr. Magdaleno Estes is here today for anticoagulation monitoring for the diagnosis of atrial fibrillation. His INR goal is 2.0-3.0 and his current Coumadin dose is Continue Coumadin to 5 mg daily except 2.5 mg on Mon-Wed-Fri . Today's findings include an INR of 3.3     Considering Mr. Jazlyn Hernandez past history, todays findings, and per the coumadin policy/protocol, Mr. Cony Ibanez was instructed to take Coumadin as follows,  2.5mg today then resume same. He was also instructed to come back in 4 weeks for an INR check. A full discussion of the nature of anticoagulants has been carried out. A full discussion of the need for frequent and regular monitoring, precise dosage adjustment and compliance was stressed. Side effects of potential bleeding were discussed and Mr. Cony Ibanez was instructed to call [phone] if there are any signs of abnormal bleeding. Mr. Cony Ibanez was instructed to avoid any OTC items containing aspirin or ibuprofen and prior to starting any new OTC products to consult with his physician or pharmacist to ensure no drug interactions are present. Mr. Cony Ibanez was instructed to avoid any major changes in his general diet and to avoid alcohol consumption. .      Mr. Cony Ibanez verbalized his understanding of all instructions and will call the office with any questions, concerns, or signs of abnormal bleeding or blood clot.

## 2023-10-26 ENCOUNTER — ANTI-COAG VISIT (OUTPATIENT)
Age: 48
End: 2023-10-26

## 2023-10-26 DIAGNOSIS — I48.92 ATRIAL FLUTTER, UNSPECIFIED TYPE (HCC): ICD-10-CM

## 2023-10-26 DIAGNOSIS — Z79.899 ENCOUNTER FOR LONG-TERM (CURRENT) USE OF MEDICATIONS: Primary | ICD-10-CM

## 2023-10-26 LAB
POC INR: 1.8
PROTHROMBIN TIME, POC: ABNORMAL

## 2023-10-26 NOTE — PROGRESS NOTES
Mr. Juan Camacho is here today for anticoagulation monitoring for the diagnosis of atrial fibrillation. His INR goal is 2.0-3.0 and his current Coumadin dose is Continue Coumadin to 5 mg daily except 2.5 mg on Mon-Wed-Fri . Today's findings include an INR of 1.8     Considering Mr. Estrada Rolling past history, todays findings, and per the coumadin policy/protocol, Mr. Ronnie Murcia was instructed to take Coumadin as follows,  same. He was also instructed to come back in 4 weeks for an INR check. A full discussion of the nature of anticoagulants has been carried out. A full discussion of the need for frequent and regular monitoring, precise dosage adjustment and compliance was stressed. Side effects of potential bleeding were discussed and Mr. Ronnie Murcia was instructed to call [phone] if there are any signs of abnormal bleeding. Mr. Ronnie Murcia was instructed to avoid any OTC items containing aspirin or ibuprofen and prior to starting any new OTC products to consult with his physician or pharmacist to ensure no drug interactions are present. Mr. Ronnie Murcia was instructed to avoid any major changes in his general diet and to avoid alcohol consumption. .      Mr. Ronnie Murcia verbalized his understanding of all instructions and will call the office with any questions, concerns, or signs of abnormal bleeding or blood clot.

## 2023-11-30 ENCOUNTER — ANTI-COAG VISIT (OUTPATIENT)
Age: 48
End: 2023-11-30
Payer: COMMERCIAL

## 2023-11-30 DIAGNOSIS — I48.92 ATRIAL FLUTTER (HCC): ICD-10-CM

## 2023-11-30 DIAGNOSIS — Z79.899 ENCOUNTER FOR LONG-TERM (CURRENT) USE OF MEDICATIONS: Primary | ICD-10-CM

## 2023-11-30 LAB
POC INR: 2.9
PROTHROMBIN TIME, POC: NORMAL

## 2023-11-30 PROCEDURE — 85610 PROTHROMBIN TIME: CPT | Performed by: INTERNAL MEDICINE

## 2023-12-01 NOTE — PATIENT INSTRUCTIONS
Mr. Sachin Banks is here today for anticoagulation monitoring for the diagnosis of  Atrial Flutter . His INR goal is 2.0-3.0 and his current Coumadin dose is 2.5mg M-W-F otherwise; 5mg A.O.D. Today's findings include an INR of 2.9     Considering Mr. Denai Ford past history, todays findings, and per the coumadin policy/protocol, Mr. Key Nixon was instructed to take Coumadin as follows,  continue current regimen. He was also instructed to come back in 4 weeks for an INR check. A full discussion of the nature of anticoagulants has been carried out. A full discussion of the need for frequent and regular monitoring, precise dosage adjustment and compliance was stressed. Side effects of potential bleeding were discussed and Mr. Key Nixon was instructed to call [phone] if there are any signs of abnormal bleeding. Mr. Key Nixon was instructed to avoid any OTC items containing aspirin or ibuprofen and prior to starting any new OTC products to consult with his physician or pharmacist to ensure no drug interactions are present. Mr. Key Nixon was instructed to avoid any major changes in his general diet and to avoid alcohol consumption. .      Mr. Key Nixon verbalized his understanding of all instructions and will call the office with any questions, concerns, or signs of abnormal bleeding or blood clot.

## 2023-12-29 ENCOUNTER — ANTI-COAG VISIT (OUTPATIENT)
Age: 48
End: 2023-12-29
Payer: COMMERCIAL

## 2023-12-29 DIAGNOSIS — Z79.899 ENCOUNTER FOR LONG-TERM (CURRENT) USE OF MEDICATIONS: Primary | ICD-10-CM

## 2023-12-29 DIAGNOSIS — I48.92 ATRIAL FLUTTER, UNSPECIFIED TYPE (HCC): ICD-10-CM

## 2023-12-29 LAB
POC INR: 4.1
PROTHROMBIN TIME, POC: ABNORMAL

## 2023-12-29 PROCEDURE — 85610 PROTHROMBIN TIME: CPT | Performed by: INTERNAL MEDICINE

## 2023-12-29 NOTE — PROGRESS NOTES
Mr. Bailey Thurman is here today for anticoagulation monitoring for the diagnosis of atrial fibrillation. His INR goal is 2.0-3.0 and his current Coumadin dose is Continue Coumadin to 5 mg daily except 2.5 mg on Mon-Wed-Fri . Today's findings include an INR of 4.1     Considering Mr. Bryce Lanier past history, todays findings, and per the coumadin policy/protocol, Mr. Esha Diaz was instructed to take Coumadin as follows,  hold x2, take 1/2 tab Sunday then resume same . He was also instructed to come back in 4 weeks for an INR check. A full discussion of the nature of anticoagulants has been carried out. A full discussion of the need for frequent and regular monitoring, precise dosage adjustment and compliance was stressed. Side effects of potential bleeding were discussed and Mr. Esha Diaz was instructed to call [phone] if there are any signs of abnormal bleeding. Mr. Esha Diaz was instructed to avoid any OTC items containing aspirin or ibuprofen and prior to starting any new OTC products to consult with his physician or pharmacist to ensure no drug interactions are present. Mr. Esha Diaz was instructed to avoid any major changes in his general diet and to avoid alcohol consumption. .      Mr. Esha Diaz verbalized his understanding of all instructions and will call the office with any questions, concerns, or signs of abnormal bleeding or blood clot.

## 2024-01-25 ENCOUNTER — ANTI-COAG VISIT (OUTPATIENT)
Age: 49
End: 2024-01-25
Payer: COMMERCIAL

## 2024-01-25 DIAGNOSIS — Z79.899 ENCOUNTER FOR LONG-TERM (CURRENT) USE OF MEDICATIONS: Primary | ICD-10-CM

## 2024-01-25 DIAGNOSIS — I48.92 ATRIAL FLUTTER, UNSPECIFIED TYPE (HCC): ICD-10-CM

## 2024-01-25 LAB
POC INR: 2.1
PROTHROMBIN TIME, POC: NORMAL

## 2024-01-25 PROCEDURE — 85610 PROTHROMBIN TIME: CPT | Performed by: INTERNAL MEDICINE

## 2024-01-25 NOTE — PROGRESS NOTES
Mr. Bala Osborne is here today for anticoagulation monitoring for the diagnosis of atrial fibrillation.  His INR goal is 2.0-3.0 and his current Coumadin dose is Continue Coumadin to 5 mg daily except 2.5 mg on Mon-Wed-Fri .     Today's findings include an INR of 2.1    Considering Mr. Osborne's past history, todays findings, and per the coumadin policy/protocol, Mr. Osborne was instructed to take Coumadin as follows, same.  He was also instructed to come back in 4 weeks for an INR check.    A full discussion of the nature of anticoagulants has been carried out.  A full discussion of the need for frequent and regular monitoring, precise dosage adjustment and compliance was stressed.  Side effects of potential bleeding were discussed and Mr. Osborne was instructed to call [phone] if there are any signs of abnormal bleeding.  Mr. Osborne was instructed to avoid any OTC items containing aspirin or ibuprofen and prior to starting any new OTC products to consult with his physician or pharmacist to ensure no drug interactions are present.  Mr. Osborne was instructed to avoid any major changes in his general diet and to avoid alcohol consumption.  .      Mr. Osborne verbalized his understanding of all instructions and will call the office with any questions, concerns, or signs of abnormal bleeding or blood clot.

## 2024-02-15 RX ORDER — METOPROLOL SUCCINATE 25 MG/1
25 TABLET, EXTENDED RELEASE ORAL 2 TIMES DAILY
Qty: 180 TABLET | Refills: 3 | Status: SHIPPED | OUTPATIENT
Start: 2024-02-15

## 2024-02-23 ENCOUNTER — ANTI-COAG VISIT (OUTPATIENT)
Age: 49
End: 2024-02-23
Payer: COMMERCIAL

## 2024-02-23 DIAGNOSIS — I48.92 ATRIAL FLUTTER, UNSPECIFIED TYPE (HCC): ICD-10-CM

## 2024-02-23 DIAGNOSIS — Z79.899 ENCOUNTER FOR LONG-TERM (CURRENT) USE OF MEDICATIONS: Primary | ICD-10-CM

## 2024-02-23 LAB
POC INR: 2.2
PROTHROMBIN TIME, POC: NORMAL

## 2024-02-23 PROCEDURE — 85610 PROTHROMBIN TIME: CPT | Performed by: INTERNAL MEDICINE

## 2024-02-23 NOTE — PROGRESS NOTES
Mr. Bala Osborne is here today for anticoagulation monitoring for the diagnosis of atrial fibrillation.  His INR goal is 2.0-3.0 and his current Coumadin dose is Continue Coumadin to 5 mg daily except 2.5 mg on Mon-Wed-Fri .     Today's findings include an INR of 2.2     Considering Mr. Osborne's past history, todays findings, and per the coumadin policy/protocol, Mr. Osborne was instructed to take Coumadin as follows,  same.  He was also instructed to come back in 4 weeks for an INR check.    A full discussion of the nature of anticoagulants has been carried out.  A full discussion of the need for frequent and regular monitoring, precise dosage adjustment and compliance was stressed.  Side effects of potential bleeding were discussed and Mr. Osborne was instructed to call [phone] if there are any signs of abnormal bleeding.  Mr. Osborne was instructed to avoid any OTC items containing aspirin or ibuprofen and prior to starting any new OTC products to consult with his physician or pharmacist to ensure no drug interactions are present.  Mr. Osborne was instructed to avoid any major changes in his general diet and to avoid alcohol consumption.  .      Mr. Osborne verbalized his understanding of all instructions and will call the office with any questions, concerns, or signs of abnormal bleeding or blood clot.

## 2024-03-22 ENCOUNTER — ANTI-COAG VISIT (OUTPATIENT)
Age: 49
End: 2024-03-22
Payer: COMMERCIAL

## 2024-03-22 DIAGNOSIS — I48.92 ATRIAL FLUTTER, UNSPECIFIED TYPE (HCC): ICD-10-CM

## 2024-03-22 DIAGNOSIS — Z79.899 ENCOUNTER FOR LONG-TERM (CURRENT) USE OF MEDICATIONS: Primary | ICD-10-CM

## 2024-03-22 LAB
POC INR: 2.4
PROTHROMBIN TIME, POC: NORMAL

## 2024-03-22 PROCEDURE — 85610 PROTHROMBIN TIME: CPT | Performed by: INTERNAL MEDICINE

## 2024-03-22 NOTE — PROGRESS NOTES
Mr. Bala Osborne is here today for anticoagulation monitoring for the diagnosis of atrial fibrillation.  His INR goal is 2.0-3.0 and his current Coumadin dose is Continue Coumadin to 5 mg daily except 2.5 mg on Mon-Wed-Fri .     Today's findings include an INR of 2.4     Considering Mr. Osborne's past history, todays findings, and per the coumadin policy/protocol, Mr. Osborne was instructed to take Coumadin as follows,  same.  He was also instructed to come back in 4 weeks for an INR check.    A full discussion of the nature of anticoagulants has been carried out.  A full discussion of the need for frequent and regular monitoring, precise dosage adjustment and compliance was stressed.  Side effects of potential bleeding were discussed and Mr. Osborne was instructed to call [phone] if there are any signs of abnormal bleeding.  Mr. Osborne was instructed to avoid any OTC items containing aspirin or ibuprofen and prior to starting any new OTC products to consult with his physician or pharmacist to ensure no drug interactions are present.  Mr. Osborne was instructed to avoid any major changes in his general diet and to avoid alcohol consumption.  .      Mr. Osborne verbalized his understanding of all instructions and will call the office with any questions, concerns, or signs of abnormal bleeding or blood clot.

## 2024-04-26 ENCOUNTER — ANTI-COAG VISIT (OUTPATIENT)
Age: 49
End: 2024-04-26
Payer: COMMERCIAL

## 2024-04-26 DIAGNOSIS — I48.92 ATRIAL FLUTTER, UNSPECIFIED TYPE (HCC): ICD-10-CM

## 2024-04-26 DIAGNOSIS — Z79.899 ENCOUNTER FOR LONG-TERM (CURRENT) USE OF MEDICATIONS: Primary | ICD-10-CM

## 2024-04-26 LAB
POC INR: 2.4
PROTHROMBIN TIME, POC: NORMAL

## 2024-04-26 PROCEDURE — 85610 PROTHROMBIN TIME: CPT | Performed by: INTERNAL MEDICINE

## 2024-04-26 NOTE — PROGRESS NOTES
Mr. Bala Osborne is here today for anticoagulation monitoring for the diagnosis of atrial fibrillation.  His INR goal is 2.0-3.0 and his current Coumadin dose is Continue Coumadin to 5 mg daily except 2.5 mg on Mon-Wed-Fri .     Today's findings include an INR of 2.4     Considering Mr. Osborne's past history, todays findings, and per the coumadin policy/protocol, Mr. Osborne was instructed to take Coumadin as follows,  same.  He was also instructed to come back in 4 weeks for an INR check.    A full discussion of the nature of anticoagulants has been carried out.  A full discussion of the need for frequent and regular monitoring, precise dosage adjustment and compliance was stressed.  Side effects of potential bleeding were discussed and Mr. Osborne was instructed to call [phone] if there are any signs of abnormal bleeding.  Mr. Osborne was instructed to avoid any OTC items containing aspirin or ibuprofen and prior to starting any new OTC products to consult with his physician or pharmacist to ensure no drug interactions are present.  Mr. Osborne was instructed to avoid any major changes in his general diet and to avoid alcohol consumption.  .      Mr. Osborne verbalized his understanding of all instructions and will call the office with any questions, concerns, or signs of abnormal bleeding or blood clot.

## 2024-05-28 ENCOUNTER — ANTI-COAG VISIT (OUTPATIENT)
Age: 49
End: 2024-05-28
Payer: COMMERCIAL

## 2024-05-28 DIAGNOSIS — Z79.899 ENCOUNTER FOR LONG-TERM (CURRENT) USE OF MEDICATIONS: Primary | ICD-10-CM

## 2024-05-28 DIAGNOSIS — I48.92 ATRIAL FLUTTER, UNSPECIFIED TYPE (HCC): ICD-10-CM

## 2024-05-28 LAB
POC INR: 4.6
PROTHROMBIN TIME, POC: ABNORMAL

## 2024-05-28 PROCEDURE — 85610 PROTHROMBIN TIME: CPT | Performed by: INTERNAL MEDICINE

## 2024-05-28 NOTE — PROGRESS NOTES
Mr. Bala Osborne is here today for anticoagulation monitoring for the diagnosis of atrial fibrillation.  His INR goal is 2.0-3.0 and his current Coumadin dose is Continue Coumadin to 5 mg daily except 2.5 mg on Mon-Wed-Fri .     Today's findings include an INR of 4.6     Considering Mr. Osborne's past history, todays findings, and per the coumadin policy/protocol, Mr. Osborne was instructed to take Coumadin as follows,  hold x2 then half tablet Thursday and resume same.  He was also instructed to come back in 4 weeks for an INR check.    A full discussion of the nature of anticoagulants has been carried out.  A full discussion of the need for frequent and regular monitoring, precise dosage adjustment and compliance was stressed.  Side effects of potential bleeding were discussed and Mr. Osborne was instructed to call [phone] if there are any signs of abnormal bleeding.  Mr. Osborne was instructed to avoid any OTC items containing aspirin or ibuprofen and prior to starting any new OTC products to consult with his physician or pharmacist to ensure no drug interactions are present.  Mr. Osborne was instructed to avoid any major changes in his general diet and to avoid alcohol consumption.  .      Mr. Osborne verbalized his understanding of all instructions and will call the office with any questions, concerns, or signs of abnormal bleeding or blood clot.

## 2024-06-09 NOTE — PROGRESS NOTES
Creatine on admission 6.4  Baseline creatine 1.2-1.6  Most recent creatine 9.70  Unclear etiology   6/6 temp HD cath placed  6/7 first iHD   6/8 iHD  6/9 placed on Bumex infusion per nephro    Plan:  Obtain FEUrea   Kidney biopsy per nephrology   Monitor strict I&O  Possible CVVHD    The INR is above the therapeutic range. Ask the patient about bleeding complications. Please make the following adjustments to Coumadin dosing: Hold today then decrease Coumadin to 2.5 mg daily except 5 mg on Sun-Tu-Th 
Repeat the INR in 3 weeks

## 2024-06-21 ENCOUNTER — ANTI-COAG VISIT (OUTPATIENT)
Age: 49
End: 2024-06-21
Payer: COMMERCIAL

## 2024-06-21 DIAGNOSIS — Z79.899 ENCOUNTER FOR LONG-TERM (CURRENT) USE OF MEDICATIONS: Primary | ICD-10-CM

## 2024-06-21 DIAGNOSIS — I48.92 ATRIAL FLUTTER, UNSPECIFIED TYPE (HCC): ICD-10-CM

## 2024-06-21 LAB
POC INR: 3.1
PROTHROMBIN TIME, POC: ABNORMAL

## 2024-06-21 PROCEDURE — 85610 PROTHROMBIN TIME: CPT | Performed by: INTERNAL MEDICINE

## 2024-06-21 NOTE — PROGRESS NOTES
Mr. Bala Osborne is here today for anticoagulation monitoring for the diagnosis of atrial fibrillation.  His INR goal is 2.0-3.0 and his current Coumadin dose is Continue Coumadin to 5 mg daily except 2.5 mg on Mon-Wed-Fri.     Today's findings include an INR of 3.1     Considering Mr. Osborne's past history, todays findings, and per the coumadin policy/protocol, Mr. Osborne was instructed to take Coumadin as follows,  same.  He was also instructed to come back in 4 weeks for an INR check.    A full discussion of the nature of anticoagulants has been carried out.  A full discussion of the need for frequent and regular monitoring, precise dosage adjustment and compliance was stressed.  Side effects of potential bleeding were discussed and Mr. Osborne was instructed to call [phone] if there are any signs of abnormal bleeding.  Mr. Osborne was instructed to avoid any OTC items containing aspirin or ibuprofen and prior to starting any new OTC products to consult with his physician or pharmacist to ensure no drug interactions are present.  Mr. Osborne was instructed to avoid any major changes in his general diet and to avoid alcohol consumption.  .      Mr. Osborne verbalized his understanding of all instructions and will call the office with any questions, concerns, or signs of abnormal bleeding or blood clot.

## 2024-07-19 ENCOUNTER — ANTI-COAG VISIT (OUTPATIENT)
Age: 49
End: 2024-07-19
Payer: COMMERCIAL

## 2024-07-19 DIAGNOSIS — I48.92 ATRIAL FLUTTER, UNSPECIFIED TYPE (HCC): ICD-10-CM

## 2024-07-19 DIAGNOSIS — Z79.899 ENCOUNTER FOR LONG-TERM (CURRENT) USE OF MEDICATIONS: Primary | ICD-10-CM

## 2024-07-19 LAB
POC INR: 3.6
PROTHROMBIN TIME, POC: NORMAL

## 2024-07-19 PROCEDURE — 85610 PROTHROMBIN TIME: CPT | Performed by: INTERNAL MEDICINE

## 2024-07-19 NOTE — PROGRESS NOTES
Mr. Bala Osborne is here today for anticoagulation monitoring for the diagnosis of atrial fibrillation.  His INR goal is 2.0-3.0 and his current Coumadin dose is Continue Coumadin to 5 mg daily except 2.5 mg on Mon-Wed-Fri .     Today's findings include an INR of 3.6     Considering Mr. Osborne's past history, todays findings, and per the coumadin policy/protocol, Mr. Osborne was instructed to take Coumadin as follows,  same.  He was also instructed to come back in 4 weeks for an INR check.    A full discussion of the nature of anticoagulants has been carried out.  A full discussion of the need for frequent and regular monitoring, precise dosage adjustment and compliance was stressed.  Side effects of potential bleeding were discussed and Mr. Osborne was instructed to call [phone] if there are any signs of abnormal bleeding.  Mr. Osborne was instructed to avoid any OTC items containing aspirin or ibuprofen and prior to starting any new OTC products to consult with his physician or pharmacist to ensure no drug interactions are present.  Mr. Osborne was instructed to avoid any major changes in his general diet and to avoid alcohol consumption.  .      Mr. Osborne verbalized his understanding of all instructions and will call the office with any questions, concerns, or signs of abnormal bleeding or blood clot.

## 2024-08-16 ENCOUNTER — ANTI-COAG VISIT (OUTPATIENT)
Age: 49
End: 2024-08-16
Payer: COMMERCIAL

## 2024-08-16 ENCOUNTER — OFFICE VISIT (OUTPATIENT)
Age: 49
End: 2024-08-16
Payer: COMMERCIAL

## 2024-08-16 VITALS
SYSTOLIC BLOOD PRESSURE: 110 MMHG | HEART RATE: 79 BPM | WEIGHT: 211 LBS | BODY MASS INDEX: 30.28 KG/M2 | OXYGEN SATURATION: 96 % | DIASTOLIC BLOOD PRESSURE: 70 MMHG

## 2024-08-16 DIAGNOSIS — I05.0 MITRAL VALVE STENOSIS, UNSPECIFIED ETIOLOGY: Primary | ICD-10-CM

## 2024-08-16 DIAGNOSIS — Z98.890 H/O MITRAL VALVE REPAIR: ICD-10-CM

## 2024-08-16 DIAGNOSIS — I48.92 ATRIAL FLUTTER, UNSPECIFIED TYPE (HCC): ICD-10-CM

## 2024-08-16 DIAGNOSIS — I35.1 AORTIC VALVE INSUFFICIENCY, ETIOLOGY OF CARDIAC VALVE DISEASE UNSPECIFIED: ICD-10-CM

## 2024-08-16 DIAGNOSIS — Z79.899 ENCOUNTER FOR LONG-TERM (CURRENT) USE OF MEDICATIONS: Primary | ICD-10-CM

## 2024-08-16 LAB
POC INR: 4.3
PROTHROMBIN TIME, POC: ABNORMAL

## 2024-08-16 PROCEDURE — 99214 OFFICE O/P EST MOD 30 MIN: CPT | Performed by: INTERNAL MEDICINE

## 2024-08-16 PROCEDURE — 85610 PROTHROMBIN TIME: CPT | Performed by: INTERNAL MEDICINE

## 2024-08-16 NOTE — PROGRESS NOTES
Mr. Bala Osborne is here today for anticoagulation monitoring for the diagnosis of atrial fibrillation.  His INR goal is 2.0-3.0 and his current Coumadin dose is Continue Coumadin to 5 mg daily except 2.5 mg on Mon-Wed-Fri .     Today's findings include an INR of 4.3     Considering Mr. Osborne's past history, todays findings, and per the coumadin policy/protocol, Mr. Osborne was instructed to take Coumadin as follows, take 1/2 tab until tuesday.  He was also instructed to come back in 4 days for an INR check.    A full discussion of the nature of anticoagulants has been carried out.  A full discussion of the need for frequent and regular monitoring, precise dosage adjustment and compliance was stressed.  Side effects of potential bleeding were discussed and Mr. Osborne was instructed to call [phone] if there are any signs of abnormal bleeding.  Mr. Osborne was instructed to avoid any OTC items containing aspirin or ibuprofen and prior to starting any new OTC products to consult with his physician or pharmacist to ensure no drug interactions are present.  Mr. Osborne was instructed to avoid any major changes in his general diet and to avoid alcohol consumption.  .      Mr. Osborne verbalized his understanding of all instructions and will call the office with any questions, concerns, or signs of abnormal bleeding or blood clot.

## 2024-08-16 NOTE — PROGRESS NOTES
include:  Constitutional: Negative for fever, chills and malaise/fatigue.   HEENT: No congestion or recent URI.  Gastrointestinal: No nausea, vomiting, abdominal pain, bloody stools.  Pulmonary:  Negative except as stated above.  Cardiac:  Negative except as stated above.  Musculoskeletal: Negative except as stated above.  Neurological:  No localized symptoms.  Endocrine:  Negative except as stated above.    PHYSICAL EXAM  BP Readings from Last 3 Encounters:   08/16/24 110/70   08/10/23 104/70   07/31/23 96/64     Pulse Readings from Last 3 Encounters:   08/16/24 79   08/10/23 76   09/28/22 85     General:   Well developed, well groomed.    Head/Neck:   No obvious jugular venous distention     No obvious carotid pulsations.      No evidence of xanthelasma.  Lungs:   No respiratory distress.      Clear bilaterally.  Heart:  Regular rate and rhythm.    Abdomen:   Soft and nontender  Extremities:   Intact peripheral pulses.      No significant edema.    Neurological:   Alert and oriented to person, place, time.      No focal neurological deficit visually.  Skin:   No obvious rash

## 2024-08-20 ENCOUNTER — ANTI-COAG VISIT (OUTPATIENT)
Age: 49
End: 2024-08-20
Payer: COMMERCIAL

## 2024-08-20 DIAGNOSIS — I48.92 ATRIAL FLUTTER, UNSPECIFIED TYPE (HCC): ICD-10-CM

## 2024-08-20 DIAGNOSIS — Z79.899 ENCOUNTER FOR LONG-TERM (CURRENT) USE OF MEDICATIONS: Primary | ICD-10-CM

## 2024-08-20 LAB
POC INR: 2.1
PROTHROMBIN TIME, POC: NORMAL

## 2024-08-20 PROCEDURE — 85610 PROTHROMBIN TIME: CPT | Performed by: INTERNAL MEDICINE

## 2024-08-20 NOTE — PROGRESS NOTES
Mr. Bala Osborne is here today for anticoagulation monitoring for the diagnosis of atrial fibrillation.  His INR goal is 2.0-3.0 and his current Coumadin dose is Continue Coumadin to 5 mg daily except 2.5 mg on Mon-Wed-Fri .     Today's findings include an INR of 2.1     Considering Mr. Osborne's past history, todays findings, and per the coumadin policy/protocol, Mr. Osborne was instructed to take Coumadin as follows,  same.  He was also instructed to come back in 4 weeks for an INR check.    A full discussion of the nature of anticoagulants has been carried out.  A full discussion of the need for frequent and regular monitoring, precise dosage adjustment and compliance was stressed.  Side effects of potential bleeding were discussed and Mr. Osborne was instructed to call [phone] if there are any signs of abnormal bleeding.  Mr. Osborne was instructed to avoid any OTC items containing aspirin or ibuprofen and prior to starting any new OTC products to consult with his physician or pharmacist to ensure no drug interactions are present.  Mr. Osborne was instructed to avoid any major changes in his general diet and to avoid alcohol consumption.  .      Mr. Osborne verbalized his understanding of all instructions and will call the office with any questions, concerns, or signs of abnormal bleeding or blood clot.

## 2024-09-13 ENCOUNTER — ANTI-COAG VISIT (OUTPATIENT)
Age: 49
End: 2024-09-13
Payer: COMMERCIAL

## 2024-09-13 DIAGNOSIS — I48.92 ATRIAL FLUTTER, UNSPECIFIED TYPE (HCC): ICD-10-CM

## 2024-09-13 DIAGNOSIS — Z79.899 ENCOUNTER FOR LONG-TERM (CURRENT) USE OF MEDICATIONS: Primary | ICD-10-CM

## 2024-09-13 LAB
POC INR: 4
PROTHROMBIN TIME, POC: ABNORMAL

## 2024-09-13 PROCEDURE — 85610 PROTHROMBIN TIME: CPT | Performed by: INTERNAL MEDICINE

## 2024-10-01 ENCOUNTER — ANTI-COAG VISIT (OUTPATIENT)
Age: 49
End: 2024-10-01
Payer: COMMERCIAL

## 2024-10-01 DIAGNOSIS — Z79.899 ENCOUNTER FOR LONG-TERM (CURRENT) USE OF MEDICATIONS: Primary | ICD-10-CM

## 2024-10-01 DIAGNOSIS — I48.92 ATRIAL FLUTTER, UNSPECIFIED TYPE (HCC): ICD-10-CM

## 2024-10-01 LAB
POC INR: 1.6
PROTHROMBIN TIME, POC: ABNORMAL

## 2024-10-01 PROCEDURE — 85610 PROTHROMBIN TIME: CPT | Performed by: INTERNAL MEDICINE

## 2024-10-01 NOTE — PROGRESS NOTES
Mr. Bala Osborne is here today for anticoagulation monitoring for the diagnosis of atrial fibrillation.  His INR goal is 2.0-3.0 and his current Coumadin dose is Continue Coumadin to 5 mg daily except 2.5 mg on Mon-Wed-Fri .     Today's findings include an INR of 1.6     Considering Mr. Osborne's past history, todays findings, and per the coumadin policy/protocol, Mr. Osborne was instructed to take Coumadin as follows,  take 10mg tonight, 5mg tomorrow and resumesame.  He was also instructed to come back in 2 weeks for an INR check.    A full discussion of the nature of anticoagulants has been carried out.  A full discussion of the need for frequent and regular monitoring, precise dosage adjustment and compliance was stressed.  Side effects of potential bleeding were discussed and Mr. Osborne was instructed to call [phone] if there are any signs of abnormal bleeding.  Mr. Osborne was instructed to avoid any OTC items containing aspirin or ibuprofen and prior to starting any new OTC products to consult with his physician or pharmacist to ensure no drug interactions are present.  Mr. Osborne was instructed to avoid any major changes in his general diet and to avoid alcohol consumption.  .      Mr. Osborne verbalized his understanding of all instructions and will call the office with any questions, concerns, or signs of abnormal bleeding or blood clot.

## 2024-10-18 ENCOUNTER — ANTI-COAG VISIT (OUTPATIENT)
Age: 49
End: 2024-10-18
Payer: COMMERCIAL

## 2024-10-18 DIAGNOSIS — Z79.899 ENCOUNTER FOR LONG-TERM (CURRENT) USE OF MEDICATIONS: Primary | ICD-10-CM

## 2024-10-18 DIAGNOSIS — I48.92 ATRIAL FLUTTER, UNSPECIFIED TYPE (HCC): ICD-10-CM

## 2024-10-18 LAB
POC INR: 2.4
PROTHROMBIN TIME, POC: NORMAL

## 2024-10-18 PROCEDURE — 85610 PROTHROMBIN TIME: CPT | Performed by: INTERNAL MEDICINE

## 2024-10-18 NOTE — PROGRESS NOTES
Mr. Bala Osborne is here today for anticoagulation monitoring for the diagnosis of atrial fibrillation.  His INR goal is 2.0-3.0 and his current Coumadin dose is Continue Coumadin to 5 mg daily except 2.5 mg on Mon-Wed-Fri .     Today's findings include an INR of 2.4     Considering Mr. Osborne's past history, todays findings, and per the coumadin policy/protocol, Mr. Osborne was instructed to take Coumadin as follows,  same.  He was also instructed to come back in 4 weeks for an INR check.    A full discussion of the nature of anticoagulants has been carried out.  A full discussion of the need for frequent and regular monitoring, precise dosage adjustment and compliance was stressed.  Side effects of potential bleeding were discussed and Mr. Osborne was instructed to call [phone] if there are any signs of abnormal bleeding.  Mr. Osborne was instructed to avoid any OTC items containing aspirin or ibuprofen and prior to starting any new OTC products to consult with his physician or pharmacist to ensure no drug interactions are present.  Mr. Osborne was instructed to avoid any major changes in his general diet and to avoid alcohol consumption.  .      Mr. Osborne verbalized his understanding of all instructions and will call the office with any questions, concerns, or signs of abnormal bleeding or blood clot.       Faxed labs to Dr. Paul Kothari

## 2024-10-21 RX ORDER — WARFARIN SODIUM 5 MG/1
5 TABLET ORAL DAILY
Qty: 90 TABLET | Refills: 3 | Status: SHIPPED | OUTPATIENT
Start: 2024-10-21

## 2024-11-12 ENCOUNTER — TELEPHONE (OUTPATIENT)
Age: 49
End: 2024-11-12

## 2024-11-12 ENCOUNTER — ANTI-COAG VISIT (OUTPATIENT)
Age: 49
End: 2024-11-12
Payer: COMMERCIAL

## 2024-11-12 DIAGNOSIS — Z79.899 ENCOUNTER FOR LONG-TERM (CURRENT) USE OF MEDICATIONS: Primary | ICD-10-CM

## 2024-11-12 DIAGNOSIS — I48.92 ATRIAL FLUTTER, UNSPECIFIED TYPE (HCC): ICD-10-CM

## 2024-11-12 LAB
POC INR: 4.4
PROTHROMBIN TIME, POC: ABNORMAL

## 2024-11-12 PROCEDURE — 85610 PROTHROMBIN TIME: CPT | Performed by: INTERNAL MEDICINE

## 2024-11-12 NOTE — TELEPHONE ENCOUNTER
Patient came in today with INR of 4.4. He was out of town for 3 weeks.     He takes Coumadin to 5 mg daily except 2.5 mg on Mon-Wed-Fri. I had him hold today and will call him with further instructions.

## 2024-11-12 NOTE — PROGRESS NOTES
Mr. Bala Osborne is here today for anticoagulation monitoring for the diagnosis of atrial fibrillation.  His INR goal is 2.0-3.0 and his current Coumadin dose is Continue Coumadin to 5 mg daily except 2.5 mg on Mon-Wed-Fri .     Today's findings include an INR of 4.4     Considering Mr. Osborne's past history, todays findings, and per the coumadin policy/protocol, Mr. Osborne was instructed to take Coumadin as follows,  hold today.  He was also instructed to come back in 1 week for an INR check.    A full discussion of the nature of anticoagulants has been carried out.  A full discussion of the need for frequent and regular monitoring, precise dosage adjustment and compliance was stressed.  Side effects of potential bleeding were discussed and Mr. Osborne was instructed to call [phone] if there are any signs of abnormal bleeding.  Mr. Osborne was instructed to avoid any OTC items containing aspirin or ibuprofen and prior to starting any new OTC products to consult with his physician or pharmacist to ensure no drug interactions are present.  Mr. Osborne was instructed to avoid any major changes in his general diet and to avoid alcohol consumption.  .      Mr. Osborne verbalized his understanding of all instructions and will call the office with any questions, concerns, or signs of abnormal bleeding or blood clot.

## 2025-02-25 RX ORDER — METOPROLOL SUCCINATE 25 MG/1
25 TABLET, EXTENDED RELEASE ORAL 2 TIMES DAILY
Qty: 180 TABLET | Refills: 0 | Status: SHIPPED | OUTPATIENT
Start: 2025-02-25

## 2025-03-17 ENCOUNTER — HOSPITAL ENCOUNTER (OUTPATIENT)
Facility: HOSPITAL | Age: 50
Setting detail: RECURRING SERIES
Discharge: HOME | End: 2025-03-20
Payer: COMMERCIAL

## 2025-03-17 PROCEDURE — 97162 PT EVAL MOD COMPLEX 30 MIN: CPT

## 2025-03-17 NOTE — PROGRESS NOTES
PHYSICAL / OCCUPATIONAL THERAPY - DAILY TREATMENT NOTE (updated 3/2025)  For Eval visit    Patient Name: Bala Osborne    Date: 3/17/2025    : 1975  Insurance: Payor: [employer] / Plan: [employer] [facility] / Product Type: *No Product type* /      Patient  verified yes     Visit #   Current / Total 1 16   Time   In / Out 930 1010   Pain   In / Out 6 6   Subjective Functional Status/Changes: See POC     TREATMENT AREA =  see POC    OBJECTIVE          40 min   Eval - untimed                      Therapeutic Procedures:    Tx Min Billable or 1:1 Min (if diff from Tx Min) Procedure, Rationale, Specifics          Details if applicable:              Details if applicable:            Details if applicable:            Details if applicable:       [facility] Totals Reminder: bill using total billable min of TIMED therapeutic procedures (example: do not include dry needle or estim unattended, both untimed codes, in totals to left)  8-22 min = 1 unit; 23-37 min = 2 units; 38-52 min = 3 units; 53-67 min = 4 units; 68-82 min = 5 units   Total Total     Charge Capture    [x]  Patient Education billed concurrently with other procedures   [x] Review HEP    [] Progressed/Changed HEP, detail:    [] Other detail:       Objective Information/Functional Measures/Assessment    See POC    Patient will continue to benefit from skilled PT / OT services to modify and progress therapeutic interventions, analyze and address functional mobility deficits, analyze and address ROM deficits, analyze and address strength deficits, analyze and address soft tissue restrictions, analyze and cue for proper movement patterns, and analyze and modify for postural abnormalities to address functional deficits and attain remaining goals.    Progress toward goals / Updated goals:  [x]  See POC        PLAN  yes Continue plan of care  []  Other:      Brionna Lu, PT    3/17/2025    1:19 PM  If an interpreting service was utilized for treatment of

## 2025-03-17 NOTE — THERAPY EVALUATION
[facility]  [address], [address], [place] [address]  Ph:[phone] Fx:[phone]  Plan of Care / Statement of Necessity for Physical Therapy Services     Patient Name: Bala Osborne : 1975   Medical   Diagnosis: Radiculopathy, lumbar region Treatment Diagnosis:  M54.59  OTHER LOWER BACK PAIN   Onset Date: 3/12/25     Referral Source: Aashish Mello PA Start of Care (SOC): 3/17/2025   Prior Hospitalization: See medical history Provider #: [id number]    Prior Level of Function: Independent with ADLS, walking and community activities with no pain    Comorbidities:  Other: Mitral Valve repair done in , VSD repair done in , Ablation done on Heart      Assessment / pelayo information:  Bala Osborne is a 49 y.o.   male with Dx: Lumbosacral Radiculopathy , who reports Low back Pain/ Stiffness and Patient reports that his pain goes down to whole Left LE .  Pt rates pain as 9/10 max, 4/10 at best, 6/10 today, , increasing with ADLS, walking and community activities.Oswestry Disability Index (DANIELLA) for Low Back Pain = 42 % ; (> 41% Severe Disability) = HIGH Complexity. AROM: Lumbar Spine Flex 48 degree , AROM Lumbar spine Ext - 9 degree . MMT: BLE strength ( B knee Flex / Ext /B hip Flex strength) 3+/5 , B SLR +. Pain, tightness  and Tenderness on  Lumbar spine , Paraspinal muscles and B piriformis muscles. Pain increases with all Lumbar spine movements . Pt instructed in HEP and will f/u in clinic for PT.   Not post operative    Evaluation Complexity:  History:  MEDIUM  Complexity : 1-2 comorbidities / personal factors will impact the outcome/ POC ; Examination:  MEDIUM Complexity : 3 Standardized tests and measures addressin body structure, function, activity limitation and / or participation in recreation  ;Presentation:  MEDIUM Complexity : Evolving with changing characteristics  ;Clinical Decision Making: Oswestry Disability Index (DANIELLA) for Low

## 2025-03-20 ENCOUNTER — TELEPHONE (OUTPATIENT)
Facility: HOSPITAL | Age: 50
End: 2025-03-20

## 2025-03-20 NOTE — TELEPHONE ENCOUNTER
VIPIN on 3/20/25 at 3:55pm to schedule f/u appts. due to his insurance auth being approved for 6 visits expiring on 5/15/25. 1-2 times a week for 5 more visits.

## 2025-03-28 ENCOUNTER — OFFICE VISIT (OUTPATIENT)
Age: 50
End: 2025-03-28
Payer: COMMERCIAL

## 2025-03-28 VITALS
WEIGHT: 203 LBS | SYSTOLIC BLOOD PRESSURE: 100 MMHG | BODY MASS INDEX: 29.06 KG/M2 | DIASTOLIC BLOOD PRESSURE: 60 MMHG | HEART RATE: 88 BPM | OXYGEN SATURATION: 95 % | HEIGHT: 70 IN

## 2025-03-28 DIAGNOSIS — I48.92 ATRIAL FLUTTER, UNSPECIFIED TYPE (HCC): ICD-10-CM

## 2025-03-28 DIAGNOSIS — I35.1 AORTIC VALVE INSUFFICIENCY, ETIOLOGY OF CARDIAC VALVE DISEASE UNSPECIFIED: ICD-10-CM

## 2025-03-28 DIAGNOSIS — I05.0 MITRAL VALVE STENOSIS, UNSPECIFIED ETIOLOGY: Primary | ICD-10-CM

## 2025-03-28 DIAGNOSIS — Z79.899 ENCOUNTER FOR LONG-TERM (CURRENT) USE OF MEDICATIONS: ICD-10-CM

## 2025-03-28 LAB
POC INR: 1.8
PROTHROMBIN TIME, POC: ABNORMAL

## 2025-03-28 PROCEDURE — 85610 PROTHROMBIN TIME: CPT | Performed by: PHYSICIAN ASSISTANT

## 2025-03-28 PROCEDURE — 99213 OFFICE O/P EST LOW 20 MIN: CPT | Performed by: PHYSICIAN ASSISTANT

## 2025-03-28 NOTE — PROGRESS NOTES
Mr. Bala Osborne is here today for anticoagulation monitoring for the diagnosis of atrial fibrillation.  His INR goal is 2.0-3.0 and his current Coumadin dose is Continue Coumadin to 5 mg daily except 2.5 mg on Mon-Wed-Fri .     Today's findings include an INR of 1.8     Considering Mr. Osborne's past history, todays findings, and per the coumadin policy/protocol, Mr. Osborne was instructed to take Coumadin as follows,  5mg tonight and 3/31 then resume same.  He was also instructed to come back in 2 weeks for an INR check.    A full discussion of the nature of anticoagulants has been carried out.  A full discussion of the need for frequent and regular monitoring, precise dosage adjustment and compliance was stressed.  Side effects of potential bleeding were discussed and Mr. Osborne was instructed to call [phone] if there are any signs of abnormal bleeding.  Mr. Osborne was instructed to avoid any OTC items containing aspirin or ibuprofen and prior to starting any new OTC products to consult with his physician or pharmacist to ensure no drug interactions are present.  Mr. Osborne was instructed to avoid any major changes in his general diet and to avoid alcohol consumption.  .      Mr. Osborne verbalized his understanding of all instructions and will call the office with any questions, concerns, or signs of abnormal bleeding or blood clot.       Detail Level: Detailed Render Risk Assessment In Note?: no Additional Notes: Spot previously biopsied Additional Notes: Recheck x 2-3 months, or sooner PRN.

## 2025-03-28 NOTE — PROGRESS NOTES
History of Present Illness:  49 YOM here for follow up.  He works in marine construction.  He says he has been doing well overall.  He denies any cardiac complaints such as chest pain, dyspnea, edema, palpitations, lightheadedness or syncope.  He takes his Coumadin 2.5 mg M/W/F and 5 mg all other days of the week.  He has not had any recent bleeding complaints.  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 Grant for his renewal application.      Impression/Plan:    Paroxysmal atypical atrial flutter with right sided figure 8 ablation June 2013 by Dr. Stafford.  Redo January 2018, now stable.  Continue Toprol 25 mg BID.  Chronic Coumadin use, should be continued indefinitely due to history of recurrent atrial flutter and valve disease.  Encouraged regular INR follow-up.  Check INR today.  Remote VSD repair and mitral valve ring in 1992.  Hx Moderate mitral stenosis  Echo 09/2024: mitral valve annunoplasty ring appears well-placed without dehiscence with mean gradient stable at 6 mmHg.  Follow-up Echo prior to next appointment.  Moderate aortic insufficiency, eccentric, by echocardiogram July 2023.  No significant change on Echo 09/2024.  Follow-up Echo prior to next appointment.  Remote transient cardiomyopathy, resolved after ablation.    Follow Up: Follow up in 6 months with Nadir Grant MD . He verbalized understanding and provided opportunity for questions. All questions answered, encouraged to follow up sooner if symptoms worsen or fail to improve.      Wt Readings from Last 3 Encounters:   09/12/24 95.7 kg (211 lb)   08/16/24 95.7 kg (211 lb)   09/19/23 93.4 kg (206 lb)     Past Medical History:   Diagnosis Date    Abnormal Holter exam 07/20/2015    Normal 48-hr Holter.  Sinus rhythm, avg HR 78 bpm.  No ventricular ectopy.  Very rare supraventricular ectopy.    Anxiety     Atrial flutter with rapid ventricular response (HCC) August 2013    Status post

## 2025-04-08 ENCOUNTER — CLINICAL SUPPORT (OUTPATIENT)
Age: 50
End: 2025-04-08
Payer: COMMERCIAL

## 2025-04-08 DIAGNOSIS — I48.91 ATRIAL FIBRILLATION (HCC): ICD-10-CM

## 2025-04-08 DIAGNOSIS — Z79.899 ENCOUNTER FOR LONG-TERM (CURRENT) USE OF MEDICATIONS: Primary | ICD-10-CM

## 2025-04-08 DIAGNOSIS — I48.92 ATRIAL FLUTTER, UNSPECIFIED TYPE (HCC): ICD-10-CM

## 2025-04-08 DIAGNOSIS — I48.92 ATRIAL FLUTTER (HCC): ICD-10-CM

## 2025-04-08 DIAGNOSIS — Z98.890 H/O MITRAL VALVE REPAIR: ICD-10-CM

## 2025-04-08 DIAGNOSIS — I05.0 MITRAL VALVE STENOSIS, UNSPECIFIED ETIOLOGY: ICD-10-CM

## 2025-04-08 DIAGNOSIS — I35.1 AORTIC VALVE INSUFFICIENCY, ETIOLOGY OF CARDIAC VALVE DISEASE UNSPECIFIED: ICD-10-CM

## 2025-04-08 LAB
INR, POC: 2.2 %
VALID INTERNAL CONTROL, POC: 2.2

## 2025-04-08 PROCEDURE — 85610 PROTHROMBIN TIME: CPT | Performed by: INTERNAL MEDICINE

## 2025-04-22 ENCOUNTER — CLINICAL SUPPORT (OUTPATIENT)
Age: 50
End: 2025-04-22
Payer: COMMERCIAL

## 2025-04-22 DIAGNOSIS — I48.91 ATRIAL FIBRILLATION (HCC): Primary | ICD-10-CM

## 2025-04-22 DIAGNOSIS — Z79.899 ENCOUNTER FOR LONG-TERM (CURRENT) USE OF MEDICATIONS: ICD-10-CM

## 2025-04-22 LAB
POC INR: 1.8
PROTHROMBIN TIME, POC: 1.8

## 2025-04-22 PROCEDURE — 85610 PROTHROMBIN TIME: CPT | Performed by: INTERNAL MEDICINE

## 2025-04-29 ENCOUNTER — CLINICAL SUPPORT (OUTPATIENT)
Age: 50
End: 2025-04-29
Payer: COMMERCIAL

## 2025-04-29 DIAGNOSIS — I48.92 ATRIAL FLUTTER, UNSPECIFIED TYPE (HCC): ICD-10-CM

## 2025-04-29 DIAGNOSIS — Z79.899 ENCOUNTER FOR LONG-TERM (CURRENT) USE OF MEDICATIONS: ICD-10-CM

## 2025-04-29 DIAGNOSIS — I48.91 ATRIAL FIBRILLATION, UNSPECIFIED TYPE (HCC): Primary | ICD-10-CM

## 2025-04-29 LAB
POC INR: 2.2
PROTHROMBIN TIME, POC: NORMAL

## 2025-04-29 PROCEDURE — 85610 PROTHROMBIN TIME: CPT | Performed by: INTERNAL MEDICINE

## 2025-04-29 NOTE — PROGRESS NOTES
Mr. Bala Osborne is here today for anticoagulation monitoring for the diagnosis of atrial fibrillation.  His INR goal is 2.0-3.0 and his current Coumadin dose is Continue Coumadin to 5 mg daily except 2.5 mg on Mon-Wed-Fri .     Today's findings include an INR of 2.2     Considering Mr. Osborne's past history, todays findings, and per the coumadin policy/protocol, Mr. Osborne was instructed to take Coumadin as follows,  same.  He was also instructed to come back in 4 weeks for an INR check.    A full discussion of the nature of anticoagulants has been carried out.  A full discussion of the need for frequent and regular monitoring, precise dosage adjustment and compliance was stressed.  Side effects of potential bleeding were discussed and Mr. Osborne was instructed to call [phone] if there are any signs of abnormal bleeding.  Mr. Osborne was instructed to avoid any OTC items containing aspirin or ibuprofen and prior to starting any new OTC products to consult with his physician or pharmacist to ensure no drug interactions are present.  Mr. Osborne was instructed to avoid any major changes in his general diet and to avoid alcohol consumption.  .      Mr. Osborne verbalized his understanding of all instructions and will call the office with any questions, concerns, or signs of abnormal bleeding or blood clot.

## 2025-05-27 ENCOUNTER — CLINICAL SUPPORT (OUTPATIENT)
Age: 50
End: 2025-05-27
Payer: COMMERCIAL

## 2025-05-27 DIAGNOSIS — Z79.899 ENCOUNTER FOR LONG-TERM (CURRENT) USE OF MEDICATIONS: Primary | ICD-10-CM

## 2025-05-27 DIAGNOSIS — I48.92 ATRIAL FLUTTER, UNSPECIFIED TYPE (HCC): ICD-10-CM

## 2025-05-27 LAB
POC INR: 2.4
PROTHROMBIN TIME, POC: NORMAL

## 2025-05-27 PROCEDURE — 85610 PROTHROMBIN TIME: CPT | Performed by: INTERNAL MEDICINE

## 2025-06-09 RX ORDER — METOPROLOL SUCCINATE 25 MG/1
25 TABLET, EXTENDED RELEASE ORAL 2 TIMES DAILY
Qty: 180 TABLET | Refills: 0 | Status: SHIPPED | OUTPATIENT
Start: 2025-06-09

## 2025-06-17 ENCOUNTER — CLINICAL SUPPORT (OUTPATIENT)
Age: 50
End: 2025-06-17
Payer: COMMERCIAL

## 2025-06-17 DIAGNOSIS — I48.91 ATRIAL FIBRILLATION (HCC): ICD-10-CM

## 2025-06-17 DIAGNOSIS — Z79.01 ANTICOAGULATION GOAL OF INR 2 TO 3: Primary | ICD-10-CM

## 2025-06-17 DIAGNOSIS — I35.1 AORTIC VALVE INSUFFICIENCY, ETIOLOGY OF CARDIAC VALVE DISEASE UNSPECIFIED: ICD-10-CM

## 2025-06-17 DIAGNOSIS — I05.0 MITRAL VALVE STENOSIS, UNSPECIFIED ETIOLOGY: ICD-10-CM

## 2025-06-17 DIAGNOSIS — I48.92 ATRIAL FLUTTER, UNSPECIFIED TYPE (HCC): ICD-10-CM

## 2025-06-17 DIAGNOSIS — I48.91 ATRIAL FIBRILLATION, UNSPECIFIED TYPE (HCC): ICD-10-CM

## 2025-06-17 DIAGNOSIS — Z51.81 ANTICOAGULATION GOAL OF INR 2 TO 3: Primary | ICD-10-CM

## 2025-06-17 LAB
INR, POC: 2.6 %
VALID INTERNAL CONTROL, POC: 2.6

## 2025-06-17 PROCEDURE — 85610 PROTHROMBIN TIME: CPT | Performed by: INTERNAL MEDICINE

## 2025-07-15 ENCOUNTER — ANTI-COAG VISIT (OUTPATIENT)
Age: 50
End: 2025-07-15
Payer: COMMERCIAL

## 2025-07-15 DIAGNOSIS — I48.92 ATRIAL FLUTTER (HCC): ICD-10-CM

## 2025-07-15 DIAGNOSIS — Z79.899 ENCOUNTER FOR LONG-TERM (CURRENT) USE OF MEDICATIONS: ICD-10-CM

## 2025-07-15 LAB
POC INR: 2.5
PROTHROMBIN TIME, POC: 2.5

## 2025-07-15 PROCEDURE — 85610 PROTHROMBIN TIME: CPT | Performed by: INTERNAL MEDICINE

## 2025-08-12 ENCOUNTER — ANTI-COAG VISIT (OUTPATIENT)
Age: 50
End: 2025-08-12
Payer: COMMERCIAL

## 2025-08-12 DIAGNOSIS — Z79.899 ENCOUNTER FOR LONG-TERM (CURRENT) USE OF MEDICATIONS: Primary | ICD-10-CM

## 2025-08-12 DIAGNOSIS — I48.92 ATRIAL FLUTTER (HCC): ICD-10-CM

## 2025-08-12 LAB
INR BLD: 2.2
INR, POC: 2.2 %
VALID INTERNAL CONTROL, POC: 2.2

## 2025-08-12 PROCEDURE — 85610 PROTHROMBIN TIME: CPT | Performed by: INTERNAL MEDICINE
